# Patient Record
Sex: MALE | Race: WHITE | Employment: FULL TIME | URBAN - METROPOLITAN AREA
[De-identification: names, ages, dates, MRNs, and addresses within clinical notes are randomized per-mention and may not be internally consistent; named-entity substitution may affect disease eponyms.]

---

## 2018-01-10 PROBLEM — Z11.59 SCREENING FOR MEASLES: Status: ACTIVE | Noted: 2018-01-10

## 2018-01-10 PROBLEM — Z78.9 IMMUNE TO MEASLES: Status: ACTIVE | Noted: 2018-01-10

## 2018-01-16 PROBLEM — F33.9 RECURRENT DEPRESSION (HCC): Status: RESOLVED | Noted: 2018-01-16 | Resolved: 2018-01-16

## 2018-01-16 PROBLEM — F33.9 RECURRENT DEPRESSION (HCC): Status: ACTIVE | Noted: 2018-01-16

## 2018-02-14 ENCOUNTER — HOSPITAL ENCOUNTER (OUTPATIENT)
Dept: CT IMAGING | Age: 48
Discharge: HOME | End: 2018-02-14
Attending: INTERNAL MEDICINE
Payer: SELF-PAY

## 2018-02-14 DIAGNOSIS — Z13.6 ENCOUNTER FOR SCREENING FOR VASCULAR DISEASE: ICD-10-CM

## 2018-02-14 PROCEDURE — 75571 CT HRT W/O DYE W/CA TEST: CPT

## 2019-05-21 ENCOUNTER — HOSPITAL ENCOUNTER (OUTPATIENT)
Dept: CT IMAGING | Age: 49
Discharge: HOME | End: 2019-05-21
Attending: INTERNAL MEDICINE
Payer: COMMERCIAL

## 2019-05-21 PROCEDURE — 71250 CT THORAX DX C-: CPT

## 2021-07-19 ENCOUNTER — ANESTHESIA EVENT (OUTPATIENT)
Dept: SURGERY | Age: 51
End: 2021-07-19
Payer: COMMERCIAL

## 2021-07-20 ENCOUNTER — ANESTHESIA (OUTPATIENT)
Dept: SURGERY | Age: 51
End: 2021-07-20
Payer: COMMERCIAL

## 2021-07-20 ENCOUNTER — HOSPITAL ENCOUNTER (OUTPATIENT)
Age: 51
Setting detail: OUTPATIENT SURGERY
Discharge: HOME | End: 2021-07-20
Attending: ORTHOPAEDIC SURGERY | Admitting: ORTHOPAEDIC SURGERY
Payer: COMMERCIAL

## 2021-07-20 VITALS
TEMPERATURE: 97.6 F | HEART RATE: 56 BPM | BODY MASS INDEX: 26.14 KG/M2 | SYSTOLIC BLOOD PRESSURE: 109 MMHG | OXYGEN SATURATION: 98 % | RESPIRATION RATE: 19 BRPM | WEIGHT: 177 LBS | DIASTOLIC BLOOD PRESSURE: 72 MMHG

## 2021-07-20 DIAGNOSIS — G89.18 ACUTE POST-OPERATIVE PAIN: Primary | ICD-10-CM

## 2021-07-20 PROCEDURE — 28090 REMOVAL OF FOOT LESION: CPT | Performed by: ORTHOPAEDIC SURGERY

## 2021-07-20 PROCEDURE — 88305 TISSUE EXAM BY PATHOLOGIST: CPT

## 2021-07-20 PROCEDURE — 77030002916 HC SUT ETHLN J&J -A: Performed by: ORTHOPAEDIC SURGERY

## 2021-07-20 PROCEDURE — 76060000032 HC ANESTHESIA 0.5 TO 1 HR: Performed by: ORTHOPAEDIC SURGERY

## 2021-07-20 PROCEDURE — 76210000020 HC REC RM PH II FIRST 0.5 HR: Performed by: ORTHOPAEDIC SURGERY

## 2021-07-20 PROCEDURE — 2709999900 HC NON-CHARGEABLE SUPPLY: Performed by: ORTHOPAEDIC SURGERY

## 2021-07-20 PROCEDURE — 77030020754 HC CUF TRNQT 2BLA STRY -B: Performed by: ORTHOPAEDIC SURGERY

## 2021-07-20 PROCEDURE — 74011250636 HC RX REV CODE- 250/636: Performed by: NURSE PRACTITIONER

## 2021-07-20 PROCEDURE — 74011000250 HC RX REV CODE- 250: Performed by: ORTHOPAEDIC SURGERY

## 2021-07-20 PROCEDURE — 76010000154 HC OR TIME FIRST 0.5 HR: Performed by: ORTHOPAEDIC SURGERY

## 2021-07-20 PROCEDURE — 74011250636 HC RX REV CODE- 250/636: Performed by: REGISTERED NURSE

## 2021-07-20 PROCEDURE — 77030000032 HC CUF TRNQT ZIMM -B: Performed by: ORTHOPAEDIC SURGERY

## 2021-07-20 PROCEDURE — 76210000063 HC OR PH I REC FIRST 0.5 HR: Performed by: ORTHOPAEDIC SURGERY

## 2021-07-20 PROCEDURE — 77030002933 HC SUT MCRYL J&J -A: Performed by: ORTHOPAEDIC SURGERY

## 2021-07-20 PROCEDURE — 74011250636 HC RX REV CODE- 250/636: Performed by: ANESTHESIOLOGY

## 2021-07-20 PROCEDURE — 74011250637 HC RX REV CODE- 250/637: Performed by: ANESTHESIOLOGY

## 2021-07-20 RX ORDER — HYDROMORPHONE HYDROCHLORIDE 2 MG/ML
0.5 INJECTION, SOLUTION INTRAMUSCULAR; INTRAVENOUS; SUBCUTANEOUS
Status: DISCONTINUED | OUTPATIENT
Start: 2021-07-20 | End: 2021-07-20 | Stop reason: HOSPADM

## 2021-07-20 RX ORDER — MIDAZOLAM HYDROCHLORIDE 1 MG/ML
2 INJECTION, SOLUTION INTRAMUSCULAR; INTRAVENOUS
Status: DISCONTINUED | OUTPATIENT
Start: 2021-07-20 | End: 2021-07-20 | Stop reason: HOSPADM

## 2021-07-20 RX ORDER — MIDAZOLAM HYDROCHLORIDE 1 MG/ML
INJECTION, SOLUTION INTRAMUSCULAR; INTRAVENOUS AS NEEDED
Status: DISCONTINUED | OUTPATIENT
Start: 2021-07-20 | End: 2021-07-20 | Stop reason: HOSPADM

## 2021-07-20 RX ORDER — SODIUM CHLORIDE, SODIUM LACTATE, POTASSIUM CHLORIDE, CALCIUM CHLORIDE 600; 310; 30; 20 MG/100ML; MG/100ML; MG/100ML; MG/100ML
1000 INJECTION, SOLUTION INTRAVENOUS CONTINUOUS
Status: DISCONTINUED | OUTPATIENT
Start: 2021-07-20 | End: 2021-07-20 | Stop reason: HOSPADM

## 2021-07-20 RX ORDER — CEPHALEXIN 500 MG/1
500 CAPSULE ORAL 4 TIMES DAILY
Qty: 12 CAPSULE | Refills: 0 | Status: SHIPPED | OUTPATIENT
Start: 2021-07-20

## 2021-07-20 RX ORDER — ONDANSETRON 2 MG/ML
4 INJECTION INTRAMUSCULAR; INTRAVENOUS
Status: DISCONTINUED | OUTPATIENT
Start: 2021-07-20 | End: 2021-07-20 | Stop reason: HOSPADM

## 2021-07-20 RX ORDER — SODIUM CHLORIDE 0.9 % (FLUSH) 0.9 %
5-40 SYRINGE (ML) INJECTION EVERY 8 HOURS
Status: DISCONTINUED | OUTPATIENT
Start: 2021-07-20 | End: 2021-07-20 | Stop reason: HOSPADM

## 2021-07-20 RX ORDER — OXYCODONE HYDROCHLORIDE 5 MG/1
5 TABLET ORAL
Qty: 30 TABLET | Refills: 0 | Status: SHIPPED | OUTPATIENT
Start: 2021-07-20 | End: 2021-07-28

## 2021-07-20 RX ORDER — LIDOCAINE HYDROCHLORIDE 10 MG/ML
0.1 INJECTION INFILTRATION; PERINEURAL AS NEEDED
Status: DISCONTINUED | OUTPATIENT
Start: 2021-07-20 | End: 2021-07-20 | Stop reason: HOSPADM

## 2021-07-20 RX ORDER — SODIUM CHLORIDE 0.9 % (FLUSH) 0.9 %
5-40 SYRINGE (ML) INJECTION AS NEEDED
Status: DISCONTINUED | OUTPATIENT
Start: 2021-07-20 | End: 2021-07-20 | Stop reason: HOSPADM

## 2021-07-20 RX ORDER — BUPIVACAINE HYDROCHLORIDE 5 MG/ML
INJECTION, SOLUTION EPIDURAL; INTRACAUDAL AS NEEDED
Status: DISCONTINUED | OUTPATIENT
Start: 2021-07-20 | End: 2021-07-20 | Stop reason: HOSPADM

## 2021-07-20 RX ORDER — ALBUTEROL SULFATE 0.83 MG/ML
2.5 SOLUTION RESPIRATORY (INHALATION) AS NEEDED
Status: DISCONTINUED | OUTPATIENT
Start: 2021-07-20 | End: 2021-07-20 | Stop reason: HOSPADM

## 2021-07-20 RX ORDER — OXYCODONE HYDROCHLORIDE 5 MG/1
5 TABLET ORAL
Status: DISCONTINUED | OUTPATIENT
Start: 2021-07-20 | End: 2021-07-20 | Stop reason: HOSPADM

## 2021-07-20 RX ORDER — CEFAZOLIN SODIUM/WATER 2 G/20 ML
2 SYRINGE (ML) INTRAVENOUS ONCE
Status: COMPLETED | OUTPATIENT
Start: 2021-07-20 | End: 2021-07-20

## 2021-07-20 RX ORDER — PROPOFOL 10 MG/ML
INJECTION, EMULSION INTRAVENOUS
Status: DISCONTINUED | OUTPATIENT
Start: 2021-07-20 | End: 2021-07-20 | Stop reason: HOSPADM

## 2021-07-20 RX ORDER — PROPOFOL 10 MG/ML
INJECTION, EMULSION INTRAVENOUS AS NEEDED
Status: DISCONTINUED | OUTPATIENT
Start: 2021-07-20 | End: 2021-07-20 | Stop reason: HOSPADM

## 2021-07-20 RX ORDER — ACETAMINOPHEN 500 MG
1000 TABLET ORAL ONCE
Status: COMPLETED | OUTPATIENT
Start: 2021-07-20 | End: 2021-07-20

## 2021-07-20 RX ADMIN — ACETAMINOPHEN 1000 MG: 500 TABLET ORAL at 08:58

## 2021-07-20 RX ADMIN — PROPOFOL 50 MG: 10 INJECTION, EMULSION INTRAVENOUS at 11:33

## 2021-07-20 RX ADMIN — PROPOFOL 180 MCG/KG/MIN: 10 INJECTION, EMULSION INTRAVENOUS at 11:33

## 2021-07-20 RX ADMIN — PROPOFOL 50 MG: 10 INJECTION, EMULSION INTRAVENOUS at 11:40

## 2021-07-20 RX ADMIN — CEFAZOLIN 2 G: 1 INJECTION, POWDER, FOR SOLUTION INTRAVENOUS at 11:36

## 2021-07-20 RX ADMIN — MIDAZOLAM 2 MG: 1 INJECTION INTRAMUSCULAR; INTRAVENOUS at 11:31

## 2021-07-20 RX ADMIN — SODIUM CHLORIDE, POTASSIUM CHLORIDE, SODIUM LACTATE AND CALCIUM CHLORIDE 1000 ML: 600; 310; 30; 20 INJECTION, SOLUTION INTRAVENOUS at 08:59

## 2021-07-20 NOTE — OP NOTES
FULL OP NOTE    PATIENT NAME: Juliet Garrison  MRN: [id number]    DATE OF SURGERY: 7/20/2021    PREOPERATIVE DIAGNOSIS: Ganglion cyst of right foot [M67.471]      POSTOPERATIVE DIAGNOSIS: Ganglion cyst of right foot [M67.471]      PROCEDURE: Right foot soft tissue mass excision of medial foot, subcutaneous less than 1.5 cm², 47412    SURGEON: Tyler Pepe MD    ASSISTANT: Joselin Alonzo NP  An assistant was required for positioning, retraction, and wound closure for this procedure. HARDWARE: * No implants in log *  INDICATIONS: This patient is a 52y.o. year old male with a history of Ganglion cyst of right foot [M67.471] who has failed conservative therapy and desires surgical treatment. Risks and benefits of the procedure including, but not limited to, anesthetic complications as well as surgical complications including damage to nerves and blood vessels, risk of infection, risk of incomplete pain relief, risk of malunion, nonunion and need for additional surgery have been discussed with the patient who wishes to proceed. PROCEDURE IN DETAIL: A time out was done to confirm the operating procedure, surgeon, patient and site. During a preop surgical timeout the right lower extremity was identified as the correct surgical site and prepped and draped in a standard sterile fashion using ChloraPrep solution. A field block was obtained with 0.5% plain Marcaine. A medial approach to the mass was and opened. There was a large cystic mass identified at the medial aspect of the medial naviculocuneiform joint. The anterior tibial tendon was identified and carefully protected. The soft tissue mass was then excised sharply and then sent to pathology. The plantar aspect of the joint which appeared to be the area that the cyst was coming from was then cauterized using electrocautery. The wound was irrigated and closed using Monocryl nylon sutures. A sterile dressing was then applied.   Anesthesia was discontinued. The patient was transferred back to recovery bed. He was taken to recovery in satisfactory condition. He appeared to tolerate the procedure well. There were no apparent trouble or anesthetic complications. All needle and sponge counts are correct. TOURNIQUET TIME: Approx 6 minutes. SPECIMENS: none    ESTIMATED BLOOD LOSS: min mL.

## 2021-07-20 NOTE — ANESTHESIA PREPROCEDURE EVALUATION
Relevant Problems   NEUROLOGY   (+) ADD (attention deficit disorder)   (+) Depression      CARDIOVASCULAR   (+) Murmur       Anesthetic History     PONV          Review of Systems / Medical History  Patient summary reviewed and pertinent labs reviewed    Pulmonary        Sleep apnea           Neuro/Psych         Psychiatric history     Cardiovascular                  Exercise tolerance: >4 METS     GI/Hepatic/Renal     GERD: well controlled           Endo/Other             Other Findings              Physical Exam    Airway  Mallampati: II  TM Distance: > 6 cm  Neck ROM: normal range of motion   Mouth opening: Normal     Cardiovascular    Rhythm: regular  Rate: normal      Pertinent negatives: No murmur and peripheral edema   Dental  No notable dental hx       Pulmonary  Breath sounds clear to auscultation               Abdominal         Other Findings            Anesthetic Plan    ASA: 2  Anesthesia type: total IV anesthesia          Induction: Intravenous  Anesthetic plan and risks discussed with: Patient

## 2021-07-20 NOTE — DISCHARGE INSTRUCTIONS
INSTRUCTIONS FOLLOWING FOOT SURGERY    ACTIVITY  Elevate foot. No Ice    Protected partial weight bearing as tolerated in post op shoe. Let the office know if dressing gets saturated with water . DRESSING CARE Keep dry and in place until follow up appointment. Cover with cast bag or plastic bag when showering. CALL YOUR DOCTOR IF YOU HAVE  Excessive bleeding that does not stop after holding mild pressure over the area. Temperature of 101 degrees or above. Redness, excessive swelling or bruising, and/or green or yellow, smelly discharge from incision. Loss of sensation - cold, white or blue toes. DIET  Day of Surgery: Clear liquids until no nausea or vomiting; then light, bland diet (Baked chicken, plain rice, grits, scrambled egg, toast). Nothing Greasy, fried or spicy today  Advance to regular diet on second day, unless your doctor orders otherwise. PAIN  Take pain medications as directed by your doctor. Call your doctor if pain is NOT relieved by medication. PAIN MED SIDE EFFECTS  Constipation: Lots of fluids, try prune juice, then OTC stool softeners if necessary  Nausea: Take medication with food. AFTER ANESTHESIA  For the first 24 hours and while taking narcotics for pain: DO NOT Drive, Drink Alcoholic beverages, or make important Decisions. Be aware of dizziness following anesthesia and while taking pain medication. Preventing Infection at Home  We care about preventing infection and avoiding the spread of germs - not only when you are in the hospital but also when you return home. When you return home from the hospital, its important to take the following steps to help prevent infection and avoid spreading germs that could infect you and others. Ask everyone in your home to follow these guidelines, too. Clean Your Hands  · Clean your hands whenever your hands are visibly dirty, before you eat, before or after touching your mouth, nose or eyes, and before preparing food.  Clean them after contact with body fluids, using the restroom, touching animals or changing diapers. · When washing hands, wet them with warm water and work up a lather. Rub hands for at least 15 seconds, then rinse them and pat them dry with a clean towel or paper towel. · When using hand sanitizers, it should take about 15 seconds to rub your hands dry. If not, you probably didnt apply enough . Cover Your Sneeze or Cough  Germs are released into the air whenever you sneeze or cough. To prevent the spread of infection:  · Turn away from other people before coughing or sneezing. · Cover your mouth or nose with a tissue when you cough or sneeze. Put the tissue in the trash. · If you dont have a tissue, cough or sneeze into your upper sleeve, not your hands. · Always clean your hands after coughing or sneezing. Care for Wounds  Your skin is your bodys first line of defense against germs, but an open wound leaves an easy way for germs to enter your body. To prevent infection:  · Clean your hands before and after changing wound dressings, and wear gloves to change dressings if recommended by your doctor. · Take special care with IV lines or other devices inserted into the body. If you must touch them, clean your hands first.  · Follow any specific instructions from your doctor to care for your wounds. Contact your doctor if you experience any signs of infection, such as fever or increased redness at the surgical or wound site. Keep a Clean Home  · Clean or wipe commonly touched hard surfaces like door handles, sinks, tabletops, phones and TV remotes. · Use products labeled disinfectant to kill harmful bacteria and viruses. · Use a clean cloth or paper towel to clean and dry surfaces. Wiping surfaces with a dirty dishcloth, sponge or towel will only spread germs.   · Never share toothbrushes, [place], drinking glasses, utensils, razor blades, face cloths or bath towels to avoid spreading germs.  · Be sure that the linens that you sleep on are clean. · Keep pets away from wounds and wash your hands after touching pets, their toys or bedding. We care about you and your health. Remember, preventing infections is a team effort between you, your family, friends and health care providers. DISCHARGE SUMMARY from Nurse    PATIENT INSTRUCTIONS:    After general anesthesia or intravenous sedation, for 24 hours or while taking prescription Narcotics:  · Limit your activities  · Do not drive and operate hazardous machinery  · Do not make important personal or business decisions  · Do  not drink alcoholic beverages  · If you have not urinated within 8 hours after discharge, please contact your surgeon on call. *  Please give a list of your current medications to your Primary Care Provider. *  Please update this list whenever your medications are discontinued, doses are      changed, or new medications (including over-the-counter products) are added. *  Please carry medication information at all times in case of emergency situations. These are general instructions for a healthy lifestyle:    No smoking/ No tobacco products/ Avoid exposure to second hand smoke    Surgeon General's Warning:  Quitting smoking now greatly reduces serious risk to your health. Obesity, smoking, and sedentary lifestyle greatly increases your risk for illness    A healthy diet, regular physical exercise & weight monitoring are important for maintaining a healthy lifestyle    You may be retaining fluid if you have a history of heart failure or if you experience any of the following symptoms:  Weight gain of 3 pounds or more overnight or 5 pounds in a week, increased swelling in our hands or feet or shortness of breath while lying flat in bed. Please call your doctor as soon as you notice any of these symptoms; do not wait until your next office visit.     Recognize signs and symptoms of STROKE:    F-face looks uneven    A-arms unable to move or move unevenly    S-speech slurred or non-existent    T-time-call 911 as soon as signs and symptoms begin-DO NOT go       Back to bed or wait to see if you get better-TIME IS BRAIN. Learning About Coronavirus [phone])  Coronavirus [phone]): Overview  What is coronavirus (COVID-19)? The coronavirus disease (COVID-19) is caused by a virus. It is an illness that was first found in [place], [place], in December 2019. It has since spread worldwide. The virus can cause fever, cough, and trouble breathing. In severe cases, it can cause pneumonia and make it hard to breathe without help. It can cause death. Coronaviruses are a large group of viruses. They cause the common cold. They also cause more serious illnesses like Middle East respiratory syndrome (MERS) and severe acute respiratory syndrome (SARS). COVID-19 is caused by a novel coronavirus. That means it's a new type that has not been seen in people before. This virus spreads person-to-person through droplets from coughing and sneezing. It can also spread when you are close to someone who is infected. And it can spread when you touch something that has the virus on it, such as a doorknob or a tabletop. What can you do to protect yourself from coronavirus (COVID-19)? The best way to protect yourself from getting sick is to:  · Avoid areas where there is an outbreak. · Avoid contact with people who may be infected. · Wash your hands often with soap or alcohol-based hand sanitizers. · Avoid crowds and try to stay at least 6 feet away from other people. · Wash your hands often, especially after you cough or sneeze. Use soap and water, and scrub for at least 20 seconds. If soap and water aren't available, use an alcohol-based hand . · Avoid touching your mouth, nose, and eyes. What can you do to avoid spreading the virus to others?   To help avoid spreading the virus to others:  · Cover your mouth with a tissue when you cough or sneeze. Then throw the tissue in the trash. · Use a disinfectant to clean things that you touch often. · Wear a cloth face cover if you have to go to public areas. · Stay home if you are sick or have been exposed to the virus. Don't go to school, work, or public areas. And don't use public transportation, ride-shares, or taxis unless you have no choice. · If you are sick:  ? Leave your home only if you need to get medical care. But call the doctor's office first so they know you're coming. And wear a face cover. ? Wear the face cover whenever you're around other people. It can help stop the spread of the virus when you cough or sneeze. ? Clean and disinfect your home every day. Use household  and disinfectant wipes or sprays. Take special care to clean things that you grab with your hands. These include doorknobs, remote controls, phones, and handles on your refrigerator and microwave. And don't forget countertops, tabletops, bathrooms, and computer keyboards. When to call for help  [id number] anytime you think you may need emergency care. For example, call if:  · You have severe trouble breathing. (You can't talk at all.)  · You have constant chest pain or pressure. · You are severely dizzy or lightheaded. · You are confused or can't think clearly. · Your face and lips have a blue color. · You pass out (lose consciousness) or are very hard to wake up. Call your doctor now if you develop symptoms such as:  · Shortness of breath. · Fever. · Cough. If you need to get care, call ahead to the doctor's office for instructions before you go. Make sure you wear a face cover to prevent exposing other people to the virus. Where can you get the latest information? The following health organizations are tracking and studying this virus. Their websites contain the most up-to-date information. Manda Prather also learn what to do if you think you may have been exposed to the virus. · [place]  [facility] for Disease Control and Prevention (CDC): The CDC provides updated news about the disease and travel advice. The website also tells you how to prevent the spread of infection. www.cdc.gov  · World Health Organization [facility]): WHO offers information about the virus outbreaks. WHO also has travel advice. www.who.int  Current as of: May 8, 2020               Content Version: 12.5  © 2006-2020 Healthwise, txtr. Care instructions adapted under license by [employer] (which disclaims liability or warranty for this information). If you have questions about a medical condition or this instruction, always ask your healthcare professional. [address] any warranty or liability for your use of this information.

## 2021-07-20 NOTE — BRIEF OP NOTE
Brief Postoperative Note    Patient: Manny Andres  YOB: 1970  MRN: [id number]    Date of Procedure: 7/20/2021     Pre-Op Diagnosis: Ganglion cyst of right foot [M67.471]    Post-Op Diagnosis: Same as preoperative diagnosis.       Procedure(s):  RIGHT FOOT SOFT TISSUE MASS EXCISION    Surgeon(s):  Fredrick Bo MD    Surgical Assistant: Nurse Practitioner: Masha Calderon RN    Anesthesia: MAC     Estimated Blood Loss (mL): Minimal    Complications: None    Specimens:   ID Type Source Tests Collected by Time Destination   1 : right foot soft tissue mass  Preservative Foot, Right  Fredrick Bo MD 7/20/2021 1140 Pathology        Implants: * No implants in log *    Drains: * No LDAs found *    Findings:     Electronically Signed by Rohith Neal MD on 7/20/2021 at 3:38 PM

## 2021-07-20 NOTE — ANESTHESIA POSTPROCEDURE EVALUATION
Procedure(s):  RIGHT FOOT SOFT TISSUE MASS EXCISION. total IV anesthesia    Anesthesia Post Evaluation      Multimodal analgesia: multimodal analgesia used between 6 hours prior to anesthesia start to PACU discharge  Patient location during evaluation: bedside  Patient participation: complete - patient participated  Level of consciousness: awake and responsive to light touch  Pain management: adequate  Airway patency: patent  Anesthetic complications: no  Cardiovascular status: acceptable, hemodynamically stable, blood pressure returned to baseline and stable  Respiratory status: acceptable, unassisted, spontaneous ventilation and nonlabored ventilation  Hydration status: acceptable        INITIAL Post-op Vital signs:   Vitals Value Taken Time   BP 97/56 07/20/21 1202   Temp 36.4 °C (97.6 °F) 07/20/21 1157   Pulse 53 07/20/21 1204   Resp 12 07/20/21 1204   SpO2 99 % 07/20/21 1204   Vitals shown include unvalidated device data.

## 2021-07-20 NOTE — PERIOP NOTES
PACU DISCHARGE NOTE  Vital signs stable, pain well controlled, alert and oriented times three or at baseline, no anesthetic complications. IV removed with catheter tip intact. Written and verbal discharge instructions given, including pain control and follow up appointment. Spouse, Lisbet Graff, verbalized understanding and signed discharge instructions. All questions answered prior to discharge. Dr Renuka Batista okay to discharge at this time. Pt and all belongings taken via wheelchair and safely put in vehicle.

## 2021-07-20 NOTE — H&P
Outpatient Surgery History and Physical:  Yanelis Barahona was seen and examined. CHIEF COMPLAINT:   Right foot. PE:   There were no vitals taken for this visit. Heart:   Regular rhythm      Lungs:  Are clear      Past Medical History:    Patient Active Problem List    Diagnosis    Immune to measles    Screening for measles    Hyperlipidemia    Vitamin D deficiency    Murmur    ADD (attention deficit disorder)    Inguinal hernia    Depression       Surgical History:   Past Surgical History:   Procedure Laterality Date    HX HERNIA REPAIR  2009    bilateral    HX VASECTOMY         Social History: Patient  reports that he has never smoked. He has never used smokeless tobacco. He reports current alcohol use of about 8.0 standard drinks of alcohol per week. He reports that he does not use drugs. Family History:   Family History   Problem Relation Age of Onset    Hypertension Father     Elevated Lipids Father     Diabetes Father     Cancer Maternal Grandfather     No Known Problems Mother        Allergies: Reviewed per EMR  No Known Allergies    Medications:    No current facility-administered medications on file prior to encounter. Current Outpatient Medications on File Prior to Encounter   Medication Sig    dexmethylphenidate 10 mg tab Take 10 mg by mouth two (2) times a day.  tadalafiL (Cialis) 20 mg tablet Take one tab by mouth daily as needed       The surgery is planned for the RIGHT FOOT SOFT TISSUE MASS EXCISION . History and physical has been reviewed. The patient has been examined. There have been no significant clinical changes since the completion of the originally dated History and Physical.  Patient identified by surgeon; surgical site was confirmed by patient and surgeon. The patient is here today for outpatient surgery. I have examined the patient, no changes are noted in the patient's medical status.  Necessity for the procedure/care is still present and the history and physical above is current. See the office notes for the full long term history of the problem. Please see the recent office notes for the musculoskeletal examination.     Signed By: Yulia Major NP     July 20, 2021 8:20 AM

## 2021-08-17 ENCOUNTER — HOSPITAL ENCOUNTER (OUTPATIENT)
Dept: LAB | Age: 51
Discharge: HOME | End: 2021-08-17

## 2021-08-17 PROCEDURE — 88305 TISSUE EXAM BY PATHOLOGIST: CPT

## 2022-03-18 PROBLEM — Z78.9 IMMUNE TO MEASLES: Status: ACTIVE | Noted: 2018-01-10

## 2022-03-19 PROBLEM — Z11.59 SCREENING FOR MEASLES: Status: ACTIVE | Noted: 2018-01-10

## 2022-09-19 ENCOUNTER — OFFICE VISIT (OUTPATIENT)
Dept: UROLOGY | Age: 52
End: 2022-09-19
Payer: COMMERCIAL

## 2022-09-19 DIAGNOSIS — R97.20 ELEVATED PSA: Primary | ICD-10-CM

## 2022-09-19 LAB
BILIRUBIN, URINE, POC: NEGATIVE
BLOOD URINE, POC: NEGATIVE
GLUCOSE URINE, POC: NEGATIVE
KETONES, URINE, POC: NEGATIVE
LEUKOCYTE ESTERASE, URINE, POC: NEGATIVE
NITRITE, URINE, POC: NEGATIVE
PH, URINE, POC: 6 (ref 4.6–8)
PROTEIN,URINE, POC: NEGATIVE
SPECIFIC GRAVITY, URINE, POC: 1 (ref 1–1.03)
URINALYSIS CLARITY, POC: NORMAL
URINALYSIS COLOR, POC: NORMAL
UROBILINOGEN, POC: 0.2

## 2022-09-19 PROCEDURE — 81003 URINALYSIS AUTO W/O SCOPE: CPT | Performed by: UROLOGY

## 2022-09-19 PROCEDURE — 99204 OFFICE O/P NEW MOD 45 MIN: CPT | Performed by: UROLOGY

## 2022-09-19 ASSESSMENT — ENCOUNTER SYMPTOMS
RESPIRATORY NEGATIVE: 1
BLOOD IN STOOL: 1
HEARTBURN: 1
EYES NEGATIVE: 1
INDIGESTION: 1

## 2022-09-19 NOTE — PROGRESS NOTES
[facility]  [address] [facility] [facility]    Ryan Aguilar [address], [address]  : 1970    Chief Complaint   Patient presents with    Elevated PSA          HPI     Larissa Duran is a 54 y.o. male referred in regards to elevated psa. His father has an elevated psa but never been diagnosed with prostate cancer. Patient has no LUTS. Patient can think of no other instigating or exacerbating events. PSA:  3.5,  3.0,  4.2          Past Medical History:   Diagnosis Date    ADD (attention deficit disorder)     COVID-19 vaccine series completed 2021    pt to bring card dos    Depression 2013    no current meds    GERD (gastroesophageal reflux disease)     prn  med    Hyperlipidemia 05/15/2015    diet controlled    Leukopenia 2013    in past-- not a current problem--- pt states never been a problem    Murmur 2014    \" slight\"-- per pt per dr Kelli Dent always hear it---- never had an echo    Nausea & vomiting     with hernia surg    Right foot pain     Sleep apnea     per pt-- plans to be tested in near future    Vitamin D deficiency 2015     Past Surgical History:   Procedure Laterality Date    HERNIA REPAIR  2009    bilateral    VASECTOMY       Current Outpatient Medications   Medication Sig Dispense Refill    dexmethylphenidate (FOCALIN) 10 MG tablet Take 10 mg by mouth 2 times daily. famotidine (PEPCID) 20 MG tablet Take 20 mg by mouth daily as needed      tadalafil (CIALIS) 20 MG tablet Take one tab by mouth daily as needed       No current facility-administered medications for this visit.      Not on File  Social History     Socioeconomic History    Marital status:      Spouse name: Not on file    Number of children: Not on file    Years of education: Not on file    Highest education level: Not on file   Occupational History    Not on file   Tobacco Use    Smoking status: Never    Smokeless 3, GAIT NORMAL  CARDIAC: regular rate and rhythm  CHEST AND LUNG: Easy work of breathing, clear to auscultation bilaterally  ABDOMEN: soft, non tender, non distended, + bowel sounds, no organomegaly, no palpable masses  BRADLEY: no nodule  NEUROLOGIC: cranial nerves 2-12 grossly intact           Assessment and Plan    ICD-10-CM    1. Elevated PSA  R97.20 AMB POC URINALYSIS DIP STICK AUTO W/O MICRO     MRI PROSTATE W WO CONTRAST          We first discussed the physiology of psa. We also discussed potential causes of elevated psa including prostate cancer, inflammation, infection, BPH, and idiopathic elevations. Treatment options including rechecking psa vs. MRI prostate vs. going forward with prostate biopsy were discussed. MRI prostate was ordered. He will return to discuss results.

## 2022-10-03 ENCOUNTER — HOSPITAL ENCOUNTER (OUTPATIENT)
Dept: MRI IMAGING | Age: 52
Discharge: HOME | End: 2022-10-06

## 2022-10-03 DIAGNOSIS — R97.20 ELEVATED PSA: ICD-10-CM

## 2022-10-19 ENCOUNTER — OFFICE VISIT (OUTPATIENT)
Dept: UROLOGY | Age: 52
End: 2022-10-19
Payer: COMMERCIAL

## 2022-10-19 DIAGNOSIS — R97.20 ELEVATED PSA: Primary | ICD-10-CM

## 2022-10-19 DIAGNOSIS — N52.9 ERECTILE DYSFUNCTION, UNSPECIFIED ERECTILE DYSFUNCTION TYPE: ICD-10-CM

## 2022-10-19 LAB
BILIRUBIN, URINE, POC: NEGATIVE
BLOOD URINE, POC: NEGATIVE
GLUCOSE URINE, POC: NEGATIVE
KETONES, URINE, POC: NEGATIVE
LEUKOCYTE ESTERASE, URINE, POC: NEGATIVE
NITRITE, URINE, POC: NEGATIVE
PH, URINE, POC: 6.5 (ref 4.6–8)
PROTEIN,URINE, POC: NEGATIVE
SPECIFIC GRAVITY, URINE, POC: 1.01 (ref 1–1.03)
URINALYSIS CLARITY, POC: NORMAL
URINALYSIS COLOR, POC: NORMAL
UROBILINOGEN, POC: NORMAL

## 2022-10-19 PROCEDURE — 99214 OFFICE O/P EST MOD 30 MIN: CPT | Performed by: UROLOGY

## 2022-10-19 PROCEDURE — 81003 URINALYSIS AUTO W/O SCOPE: CPT | Performed by: UROLOGY

## 2022-10-19 RX ORDER — TADALAFIL 20 MG/1
TABLET ORAL
Qty: 30 TABLET | Refills: 5 | Status: SHIPPED | OUTPATIENT
Start: 2022-10-19

## 2022-10-19 RX ORDER — CIPROFLOXACIN 500 MG/1
500 TABLET, FILM COATED ORAL 2 TIMES DAILY
Qty: 2 TABLET | Refills: 0 | Status: SHIPPED | OUTPATIENT
Start: 2022-10-19 | End: 2022-10-20

## 2022-10-19 ASSESSMENT — ENCOUNTER SYMPTOMS: NAUSEA: 0

## 2022-10-19 NOTE — H&P (VIEW-ONLY)
[facility]  [address] [facility]    Frances[facility] [address], [address]   [address]  : 1970    Chief Complaint   Patient presents with    Follow-up          HPI     Lisa Barrios is a 50 y.o. male referred in regards to elevated psa. His father has an elevated psa but never been diagnosed with prostate cancer. Patient has no LUTS. He uses tadalafil in regards to ed. MRI prostate 10/3/22 revealed a PIRADS4 and PIRADS5 lesion. PSA:  3.5,  3.0,  4.2          Past Medical History:   Diagnosis Date    ADD (attention deficit disorder)     COVID-19 vaccine series completed 2021    pt to bring card dos    Depression 2013    no current meds    GERD (gastroesophageal reflux disease)     prn  med    Hyperlipidemia 05/15/2015    diet controlled    Leukopenia 2013    in past-- not a current problem--- pt states never been a problem    Murmur 2014    \" slight\"-- per pt per dr Marium Robert always hear it---- never had an echo    Nausea & vomiting     with hernia surg    Right foot pain     Sleep apnea     per pt-- plans to be tested in near future    Vitamin D deficiency 2015     Past Surgical History:   Procedure Laterality Date    HERNIA REPAIR  2009    bilateral    VASECTOMY       Current Outpatient Medications   Medication Sig Dispense Refill    tadalafil (CIALIS) 20 MG tablet Take 1 tablet by mouth daily 30 tablet 5    ciprofloxacin (CIPRO) 500 MG tablet Take 1 tablet by mouth 2 times daily for 1 day Begin AM of prostate biopsy. 2 tablet 0    dexmethylphenidate (FOCALIN) 10 MG tablet Take 10 mg by mouth 2 times daily. famotidine (PEPCID) 20 MG tablet Take 20 mg by mouth daily as needed       No current facility-administered medications for this visit.      No Known Allergies  Social History     Socioeconomic History    Marital status:      Spouse name: Not on file    Number of children: Not on file Years of education: Not on file    Highest education level: Not on file   Occupational History    Not on file   Tobacco Use    Smoking status: Never    Smokeless tobacco: Never   Substance and Sexual Activity    Alcohol use: Yes     Alcohol/week: 8.0 standard drinks    Drug use: No    Sexual activity: Not on file   Other Topics Concern    Not on file   Social History Narrative    Not on file     Social Determinants of Health     Financial Resource Strain: Not on file   Food Insecurity: Not on file   Transportation Needs: Not on file   Physical Activity: Not on file   Stress: Not on file   Social Connections: Not on file   Intimate Partner Violence: Not on file   Housing Stability: Not on file     Family History   Problem Relation Age of Onset    Cancer Maternal Grandfather     No Known Problems Mother     Hypertension Father     Elevated Lipids Father     Diabetes Father        Review of Systems  Constitutional:   Negative for fever. GI:  Negative for nausea. Genitourinary:  Negative for flank pain.     Urinalysis  UA - Dipstick  Results for orders placed or performed in visit on 10/19/22   AMB POC URINALYSIS DIP STICK AUTO W/O MICRO   Result Value Ref Range    Color (UA POC)      Clarity (UA POC)      Glucose, Urine, POC Negative Negative    Bilirubin, Urine, POC Negative Negative    KETONES, Urine, POC Negative Negative    Specific Gravity, Urine, POC 1.010 1.001 - 1.035    Blood (UA POC) Negative Negative    pH, Urine, POC 6.5 4.6 - 8.0    Protein, Urine, POC Negative Negative    Urobilinogen, POC 0.2 mg/dL     Nitrite, Urine, POC Negative Negative    Leukocyte Esterase, Urine, POC Negative Negative   Results for orders placed or performed in visit on 09/19/22   AMB POC URINALYSIS DIP STICK AUTO W/O MICRO   Result Value Ref Range    Color, Urine, POC      Clarity, Urine, POC      Glucose, Urine, POC Negative Negative    Bilirubin, Urine, POC Negative Negative    Ketones, Urine, POC Negative Negative Specific Gravity, Urine, POC 1.005 1.001 - 1.035    Blood, Urine, POC NEGATIVE Negative    pH, Urine, POC 6.0 4.6 - 8.0    Protein, Urine, POC Negative Negative    Urobilinogen, POC 0.2     Nitrate, Urine, POC NEGATIVE Negative    Leukocyte Esterase, Urine, POC Negative Negative       There were no vitals taken for this visit. GENERAL: NAD, ALERT, A&O x 3, GAIT NORMAL  CARDIAC: regular rate and rhythm  CHEST AND LUNG: Easy work of breathing, clear to auscultation bilaterally  ABDOMEN: soft, non tender, non distended, + bowel sounds, no organomegaly, no palpable masses  NEUROLOGIC: cranial nerves 2-12 grossly intact           Assessment and Plan    ICD-10-CM    1. Elevated PSA  R97.20 AMB POC URINALYSIS DIP STICK AUTO W/O MICRO     ciprofloxacin (CIPRO) 500 MG tablet      2. Erectile dysfunction, unspecified erectile dysfunction type  N52.9 tadalafil (CIALIS) 20 MG tablet          In regards to elevated psa, mri results were reviewed. Options were discussed. He will be scheduled for uronav guided prostate biopsy. Potential risks were discussed. In regards to ed, tadalafil was refilled.

## 2022-10-19 NOTE — PROGRESS NOTES
[facility]  [address] [facility]    Oralia Schuler [address], [address]  : 1970    Chief Complaint   Patient presents with    Follow-up          HPI     Coby Chanel is a 50 y.o. male referred in regards to elevated psa. His father has an elevated psa but never been diagnosed with prostate cancer. Patient has no LUTS. He uses tadalafil in regards to ed. MRI prostate 10/3/22 revealed a PIRADS4 and PIRADS5 lesion. PSA:  3.5,  3.0,  4.2          Past Medical History:   Diagnosis Date    ADD (attention deficit disorder)     COVID-19 vaccine series completed 2021    pt to bring card dos    Depression 2013    no current meds    GERD (gastroesophageal reflux disease)     prn  med    Hyperlipidemia 05/15/2015    diet controlled    Leukopenia 2013    in past-- not a current problem--- pt states never been a problem    Murmur 2014    \" slight\"-- per pt per dr Quinton Castro always hear it---- never had an echo    Nausea & vomiting     with hernia surg    Right foot pain     Sleep apnea     per pt-- plans to be tested in near future    Vitamin D deficiency 2015     Past Surgical History:   Procedure Laterality Date    HERNIA REPAIR  2009    bilateral    VASECTOMY       Current Outpatient Medications   Medication Sig Dispense Refill    tadalafil (CIALIS) 20 MG tablet Take 1 tablet by mouth daily 30 tablet 5    ciprofloxacin (CIPRO) 500 MG tablet Take 1 tablet by mouth 2 times daily for 1 day Begin AM of prostate biopsy. 2 tablet 0    dexmethylphenidate (FOCALIN) 10 MG tablet Take 10 mg by mouth 2 times daily. famotidine (PEPCID) 20 MG tablet Take 20 mg by mouth daily as needed       No current facility-administered medications for this visit.      No Known Allergies  Social History     Socioeconomic History    Marital status:      Spouse name: Not on file    Number of children: Not on file Years of education: Not on file    Highest education level: Not on file   Occupational History    Not on file   Tobacco Use    Smoking status: Never    Smokeless tobacco: Never   Substance and Sexual Activity    Alcohol use: Yes     Alcohol/week: 8.0 standard drinks    Drug use: No    Sexual activity: Not on file   Other Topics Concern    Not on file   Social History Narrative    Not on file     Social Determinants of Health     Financial Resource Strain: Not on file   Food Insecurity: Not on file   Transportation Needs: Not on file   Physical Activity: Not on file   Stress: Not on file   Social Connections: Not on file   Intimate Partner Violence: Not on file   Housing Stability: Not on file     Family History   Problem Relation Age of Onset    Cancer Maternal Grandfather     No Known Problems Mother     Hypertension Father     Elevated Lipids Father     Diabetes Father        Review of Systems  Constitutional:   Negative for fever. GI:  Negative for nausea. Genitourinary:  Negative for flank pain.     Urinalysis  UA - Dipstick  Results for orders placed or performed in visit on 10/19/22   AMB POC URINALYSIS DIP STICK AUTO W/O MICRO   Result Value Ref Range    Color (UA POC)      Clarity (UA POC)      Glucose, Urine, POC Negative Negative    Bilirubin, Urine, POC Negative Negative    KETONES, Urine, POC Negative Negative    Specific Gravity, Urine, POC 1.010 1.001 - 1.035    Blood (UA POC) Negative Negative    pH, Urine, POC 6.5 4.6 - 8.0    Protein, Urine, POC Negative Negative    Urobilinogen, POC 0.2 mg/dL     Nitrite, Urine, POC Negative Negative    Leukocyte Esterase, Urine, POC Negative Negative   Results for orders placed or performed in visit on 09/19/22   AMB POC URINALYSIS DIP STICK AUTO W/O MICRO   Result Value Ref Range    Color, Urine, POC      Clarity, Urine, POC      Glucose, Urine, POC Negative Negative    Bilirubin, Urine, POC Negative Negative    Ketones, Urine, POC Negative Negative Specific Gravity, Urine, POC 1.005 1.001 - 1.035    Blood, Urine, POC NEGATIVE Negative    pH, Urine, POC 6.0 4.6 - 8.0    Protein, Urine, POC Negative Negative    Urobilinogen, POC 0.2     Nitrate, Urine, POC NEGATIVE Negative    Leukocyte Esterase, Urine, POC Negative Negative       There were no vitals taken for this visit. GENERAL: NAD, ALERT, A&O x 3, GAIT NORMAL  CARDIAC: regular rate and rhythm  CHEST AND LUNG: Easy work of breathing, clear to auscultation bilaterally  ABDOMEN: soft, non tender, non distended, + bowel sounds, no organomegaly, no palpable masses  NEUROLOGIC: cranial nerves 2-12 grossly intact           Assessment and Plan    ICD-10-CM    1. Elevated PSA  R97.20 AMB POC URINALYSIS DIP STICK AUTO W/O MICRO     ciprofloxacin (CIPRO) 500 MG tablet      2. Erectile dysfunction, unspecified erectile dysfunction type  N52.9 tadalafil (CIALIS) 20 MG tablet          In regards to elevated psa, mri results were reviewed. Options were discussed. He will be scheduled for uronav guided prostate biopsy. Potential risks were discussed. In regards to ed, tadalafil was refilled.

## 2022-10-20 ENCOUNTER — TELEPHONE (OUTPATIENT)
Dept: UROLOGY | Age: 52
End: 2022-10-20

## 2022-10-20 PROBLEM — R97.20 ELEVATED PSA: Status: ACTIVE | Noted: 2022-10-20

## 2022-10-20 NOTE — TELEPHONE ENCOUNTER
Procedures: Procedure(s):   PROSTATE BIOPSY MRI FUSION   Date: 10/26/2022   Time: 1030   Location: [facility] MAIN OR 03     Scheduled.

## 2022-10-20 NOTE — TELEPHONE ENCOUNTER
----- Message from Michelle Denson MD sent at 10/19/2022  4:54 PM EDT -----  Regarding: schedule  Hospital: [address] [place] [address]     Surgeon: justino  Assist: NONE    Diagnosis: elevated psa and prostate mass    Procedure: Brandon Wood guided prostate biopsy    Posting time: 1 hour    Special Instruments Needed:  NONE    Anesthesia: GENERAL    Labs: CBC, BMP    Tests: EKG per anesthesia    Blood: NONE    Bowel Prep: NONE    Special Instructions: none    Orders/HandP: done/done    Follow up appointment: talk bx 2 weeks after

## 2022-10-21 NOTE — PROGRESS NOTES
PLEASE CONTINUE TAKING ALL PRESCRIPTION MEDICATIONS UP TO THE DAY OF SURGERY UNLESS OTHERWISE DIRECTED BELOW. DISCONTINUE all vitamins and supplements 7 days prior to surgery. DISCONTINUE Non-Steriodal Anti-Inflammatory (NSAIDS) such as Advil and Aleve 5 days prior to surgery. Home Medications to take  the day of surgery    Focalin, pepcid           Home Medications   to Hold   none        Comments       On the day before surgery please take Acetaminophen 1000mg in the morning and then again before bed. You may substitute for Tylenol 650 mg. Please do not bring home medications with you on the day of surgery unless otherwise directed by your nurse. If you are instructed to bring home medications, please give them to your nurse as they will be administered by the nursing staff. If you have any questions, please call [address] [place] [address] [phone] or [facility] [phone]. A copy of this note was provided to the patient for reference.

## 2022-10-21 NOTE — PROGRESS NOTES
Patient verified name and     Order for consent WAS found in EHR and matches case posting; patient verified. Type 1B surgery, PHONE assessment complete. Labs per surgeon: CBC, BMP--PT COMING PRIOR TO DOS FOR THESE LABS-- ORDERS IN CC  FROM THE OFFICE-- NOTE PLACED FOR CHART  TO F/U WITH RESULTS  Labs per anesthesia protocol: NONE  EKG: [facility] approved surgical skin cleanser and instructions given per hospital policy. Patient provided with and instructed on educational handouts including Guide to Surgery, Pain Management, Hand Hygiene, Blood Transfusion Education, and [facility] Brochure. Patient answered medical/surgical history questions at their best of ability. All prior to admission medications documented in [facility]. Original medication prescription bottle WAS NOT visualized during patient appointment. Patient instructed to hold all vitamins 7 days prior to surgery and NSAIDS 5 days prior to surgery, patient verbalized understanding. Patient teach back successful and patient demonstrates knowledge of instructions.

## 2022-10-25 NOTE — PERIOP NOTE
Directly informed patient and or family member of pre op arrival time 0800 on 10/26. All questions answered. Pre op instructions reviewed. Left contact information for any additional questions or needs.

## 2022-10-26 ENCOUNTER — HOSPITAL ENCOUNTER (OUTPATIENT)
Age: 52
Setting detail: OUTPATIENT SURGERY
Discharge: HOME | End: 2022-10-26
Attending: UROLOGY | Admitting: UROLOGY
Payer: COMMERCIAL

## 2022-10-26 ENCOUNTER — ANESTHESIA (OUTPATIENT)
Dept: SURGERY | Age: 52
End: 2022-10-26
Payer: COMMERCIAL

## 2022-10-26 ENCOUNTER — ANESTHESIA EVENT (OUTPATIENT)
Dept: SURGERY | Age: 52
End: 2022-10-26
Payer: COMMERCIAL

## 2022-10-26 VITALS
OXYGEN SATURATION: 99 % | RESPIRATION RATE: 16 BRPM | DIASTOLIC BLOOD PRESSURE: 73 MMHG | SYSTOLIC BLOOD PRESSURE: 114 MMHG | TEMPERATURE: 97.9 F | WEIGHT: 179 LBS | BODY MASS INDEX: 26.51 KG/M2 | HEIGHT: 69 IN | HEART RATE: 70 BPM

## 2022-10-26 DIAGNOSIS — R97.20 ELEVATED PSA: ICD-10-CM

## 2022-10-26 PROBLEM — N42.89 PROSTATE MASS: Status: ACTIVE | Noted: 2022-10-26

## 2022-10-26 LAB
ANION GAP SERPL CALC-SCNC: 6 MMOL/L (ref 2–11)
BUN SERPL-MCNC: 15 MG/DL (ref 6–23)
CALCIUM SERPL-MCNC: 9.3 MG/DL (ref 8.3–10.4)
CHLORIDE SERPL-SCNC: 111 MMOL/L (ref 101–110)
CO2 SERPL-SCNC: 25 MMOL/L (ref 21–32)
CREAT SERPL-MCNC: 1.1 MG/DL (ref 0.8–1.5)
ERYTHROCYTE [DISTWIDTH] IN BLOOD BY AUTOMATED COUNT: 13 % (ref 11.9–14.6)
GLUCOSE SERPL-MCNC: 107 MG/DL (ref 65–100)
HCT VFR BLD AUTO: 44.5 % (ref 41.1–50.3)
HGB BLD-MCNC: 15.3 G/DL (ref 13.6–17.2)
MCH RBC QN AUTO: 27.8 PG (ref 26.1–32.9)
MCHC RBC AUTO-ENTMCNC: 34.4 G/DL (ref 31.4–35)
MCV RBC AUTO: 80.8 FL (ref 82–102)
NRBC # BLD: 0 K/UL (ref 0–0.2)
PLATELET # BLD AUTO: 220 K/UL (ref 150–450)
PMV BLD AUTO: 9.6 FL (ref 9.4–12.3)
POTASSIUM SERPL-SCNC: 3.9 MMOL/L (ref 3.5–5.1)
RBC # BLD AUTO: 5.51 M/UL (ref 4.23–5.6)
SODIUM SERPL-SCNC: 142 MMOL/L (ref 133–143)
WBC # BLD AUTO: 4.9 K/UL (ref 4.3–11.1)

## 2022-10-26 PROCEDURE — 2500000003 HC RX 250 WO HCPCS: Performed by: NURSE ANESTHETIST, CERTIFIED REGISTERED

## 2022-10-26 PROCEDURE — 6370000000 HC RX 637 (ALT 250 FOR IP): Performed by: ANESTHESIOLOGY

## 2022-10-26 PROCEDURE — 7100000011 HC PHASE II RECOVERY - ADDTL 15 MIN: Performed by: UROLOGY

## 2022-10-26 PROCEDURE — 6360000002 HC RX W HCPCS: Performed by: NURSE ANESTHETIST, CERTIFIED REGISTERED

## 2022-10-26 PROCEDURE — 3700000001 HC ADD 15 MINUTES (ANESTHESIA): Performed by: UROLOGY

## 2022-10-26 PROCEDURE — 55700 PR BIOPSY OF PROSTATE,NEEDLE/PUNCH: CPT | Performed by: UROLOGY

## 2022-10-26 PROCEDURE — 80048 BASIC METABOLIC PNL TOTAL CA: CPT

## 2022-10-26 PROCEDURE — 3600000002 HC SURGERY LEVEL 2 BASE: Performed by: UROLOGY

## 2022-10-26 PROCEDURE — 7100000000 HC PACU RECOVERY - FIRST 15 MIN: Performed by: UROLOGY

## 2022-10-26 PROCEDURE — 88305 TISSUE EXAM BY PATHOLOGIST: CPT

## 2022-10-26 PROCEDURE — 6360000002 HC RX W HCPCS: Performed by: UROLOGY

## 2022-10-26 PROCEDURE — 2580000003 HC RX 258: Performed by: ANESTHESIOLOGY

## 2022-10-26 PROCEDURE — 85027 COMPLETE CBC AUTOMATED: CPT

## 2022-10-26 PROCEDURE — 2580000003 HC RX 258: Performed by: UROLOGY

## 2022-10-26 PROCEDURE — 3600000012 HC SURGERY LEVEL 2 ADDTL 15MIN: Performed by: UROLOGY

## 2022-10-26 PROCEDURE — 3700000000 HC ANESTHESIA ATTENDED CARE: Performed by: UROLOGY

## 2022-10-26 PROCEDURE — 76942 ECHO GUIDE FOR BIOPSY: CPT | Performed by: UROLOGY

## 2022-10-26 PROCEDURE — 7100000001 HC PACU RECOVERY - ADDTL 15 MIN: Performed by: UROLOGY

## 2022-10-26 PROCEDURE — 7100000010 HC PHASE II RECOVERY - FIRST 15 MIN: Performed by: UROLOGY

## 2022-10-26 PROCEDURE — 2709999900 HC NON-CHARGEABLE SUPPLY: Performed by: UROLOGY

## 2022-10-26 RX ORDER — HYDROMORPHONE HYDROCHLORIDE 2 MG/ML
0.25 INJECTION, SOLUTION INTRAMUSCULAR; INTRAVENOUS; SUBCUTANEOUS EVERY 5 MIN PRN
Status: DISCONTINUED | OUTPATIENT
Start: 2022-10-26 | End: 2022-10-26 | Stop reason: HOSPADM

## 2022-10-26 RX ORDER — ONDANSETRON 2 MG/ML
4 INJECTION INTRAMUSCULAR; INTRAVENOUS
Status: DISCONTINUED | OUTPATIENT
Start: 2022-10-26 | End: 2022-10-26 | Stop reason: HOSPADM

## 2022-10-26 RX ORDER — HYDROMORPHONE HYDROCHLORIDE 2 MG/ML
0.5 INJECTION, SOLUTION INTRAMUSCULAR; INTRAVENOUS; SUBCUTANEOUS EVERY 5 MIN PRN
Status: DISCONTINUED | OUTPATIENT
Start: 2022-10-26 | End: 2022-10-26 | Stop reason: HOSPADM

## 2022-10-26 RX ORDER — DEXTROSE MONOHYDRATE 100 MG/ML
INJECTION, SOLUTION INTRAVENOUS CONTINUOUS PRN
Status: DISCONTINUED | OUTPATIENT
Start: 2022-10-26 | End: 2022-10-26 | Stop reason: HOSPADM

## 2022-10-26 RX ORDER — LIDOCAINE HYDROCHLORIDE 10 MG/ML
1 INJECTION, SOLUTION INFILTRATION; PERINEURAL
Status: DISCONTINUED | OUTPATIENT
Start: 2022-10-26 | End: 2022-10-26 | Stop reason: HOSPADM

## 2022-10-26 RX ORDER — SODIUM CHLORIDE 9 MG/ML
INJECTION, SOLUTION INTRAVENOUS PRN
Status: DISCONTINUED | OUTPATIENT
Start: 2022-10-26 | End: 2022-10-26 | Stop reason: HOSPADM

## 2022-10-26 RX ORDER — HYDROMORPHONE HYDROCHLORIDE 2 MG/ML
0.5 INJECTION, SOLUTION INTRAMUSCULAR; INTRAVENOUS; SUBCUTANEOUS
Status: DISCONTINUED | OUTPATIENT
Start: 2022-10-26 | End: 2022-10-26 | Stop reason: HOSPADM

## 2022-10-26 RX ORDER — EPHEDRINE SULFATE/0.9% NACL/PF 50 MG/5 ML
SYRINGE (ML) INTRAVENOUS PRN
Status: DISCONTINUED | OUTPATIENT
Start: 2022-10-26 | End: 2022-10-26 | Stop reason: SDUPTHER

## 2022-10-26 RX ORDER — SODIUM CHLORIDE 0.9 % (FLUSH) 0.9 %
5-40 SYRINGE (ML) INJECTION PRN
Status: DISCONTINUED | OUTPATIENT
Start: 2022-10-26 | End: 2022-10-26 | Stop reason: HOSPADM

## 2022-10-26 RX ORDER — SODIUM CHLORIDE 0.9 % (FLUSH) 0.9 %
5-40 SYRINGE (ML) INJECTION EVERY 12 HOURS SCHEDULED
Status: DISCONTINUED | OUTPATIENT
Start: 2022-10-26 | End: 2022-10-26 | Stop reason: HOSPADM

## 2022-10-26 RX ORDER — LABETALOL HYDROCHLORIDE 5 MG/ML
10 INJECTION, SOLUTION INTRAVENOUS
Status: DISCONTINUED | OUTPATIENT
Start: 2022-10-26 | End: 2022-10-26 | Stop reason: HOSPADM

## 2022-10-26 RX ORDER — PROPOFOL 10 MG/ML
INJECTION, EMULSION INTRAVENOUS PRN
Status: DISCONTINUED | OUTPATIENT
Start: 2022-10-26 | End: 2022-10-26 | Stop reason: SDUPTHER

## 2022-10-26 RX ORDER — ACETAMINOPHEN 500 MG
1000 TABLET ORAL ONCE
Status: COMPLETED | OUTPATIENT
Start: 2022-10-26 | End: 2022-10-26

## 2022-10-26 RX ORDER — OXYCODONE HYDROCHLORIDE 5 MG/1
5 TABLET ORAL PRN
Status: DISCONTINUED | OUTPATIENT
Start: 2022-10-26 | End: 2022-10-26 | Stop reason: HOSPADM

## 2022-10-26 RX ORDER — SODIUM CHLORIDE, SODIUM LACTATE, POTASSIUM CHLORIDE, CALCIUM CHLORIDE 600; 310; 30; 20 MG/100ML; MG/100ML; MG/100ML; MG/100ML
INJECTION, SOLUTION INTRAVENOUS CONTINUOUS
Status: DISCONTINUED | OUTPATIENT
Start: 2022-10-26 | End: 2022-10-26 | Stop reason: HOSPADM

## 2022-10-26 RX ORDER — OXYCODONE HYDROCHLORIDE 5 MG/1
10 TABLET ORAL PRN
Status: DISCONTINUED | OUTPATIENT
Start: 2022-10-26 | End: 2022-10-26 | Stop reason: HOSPADM

## 2022-10-26 RX ORDER — MIDAZOLAM HYDROCHLORIDE 2 MG/2ML
2 INJECTION, SOLUTION INTRAMUSCULAR; INTRAVENOUS
Status: DISCONTINUED | OUTPATIENT
Start: 2022-10-26 | End: 2022-10-26 | Stop reason: HOSPADM

## 2022-10-26 RX ORDER — HYDROMORPHONE HYDROCHLORIDE 2 MG/ML
0.25 INJECTION, SOLUTION INTRAMUSCULAR; INTRAVENOUS; SUBCUTANEOUS
Status: DISCONTINUED | OUTPATIENT
Start: 2022-10-26 | End: 2022-10-26 | Stop reason: HOSPADM

## 2022-10-26 RX ADMIN — PROPOFOL 250 MCG/KG/MIN: 10 INJECTION, EMULSION INTRAVENOUS at 10:20

## 2022-10-26 RX ADMIN — PROPOFOL 70 MG: 10 INJECTION, EMULSION INTRAVENOUS at 10:18

## 2022-10-26 RX ADMIN — PROPOFOL 50 MG: 10 INJECTION, EMULSION INTRAVENOUS at 10:22

## 2022-10-26 RX ADMIN — ACETAMINOPHEN 1000 MG: 500 TABLET, FILM COATED ORAL at 09:35

## 2022-10-26 RX ADMIN — SODIUM CHLORIDE, POTASSIUM CHLORIDE, SODIUM LACTATE AND CALCIUM CHLORIDE: 600; 310; 30; 20 INJECTION, SOLUTION INTRAVENOUS at 09:17

## 2022-10-26 RX ADMIN — Medication 10 MG: at 10:37

## 2022-10-26 RX ADMIN — CEFTRIAXONE 1000 MG: 1 INJECTION, POWDER, FOR SOLUTION INTRAMUSCULAR; INTRAVENOUS at 10:29

## 2022-10-26 RX ADMIN — Medication 10 MG: at 10:47

## 2022-10-26 ASSESSMENT — PAIN - FUNCTIONAL ASSESSMENT
PAIN_FUNCTIONAL_ASSESSMENT: NONE - DENIES PAIN
PAIN_FUNCTIONAL_ASSESSMENT: 0-10

## 2022-10-26 NOTE — ANESTHESIA PRE PROCEDURE
Department of Anesthesiology  Preprocedure Note       Name:  Tonia Day   Age:  50 y.o.  :  1970                                          MRN:  [id number]         Date:  10/26/2022      Surgeon: Norma Pinto):  John Corea MD    Procedure: Procedure(s):  PROSTATE BIOPSY MRI FUSION    Medications prior to admission:   Prior to Admission medications    Medication Sig Start Date End Date Taking? Authorizing Provider   tadalafil (CIALIS) 20 MG tablet Take 1 tablet by mouth daily  Patient taking differently: Take 20 mg by mouth as needed Take 1 tablet by mouth daily 10/19/22   John Corea MD   dexmethylphenidate (FOCALIN) 10 MG tablet Take 10 mg by mouth 2 times daily.  10/19/20   Ar Automatic Reconciliation   famotidine (PEPCID) 20 MG tablet Take 20 mg by mouth daily    Ar Automatic Reconciliation       Current medications:    Current Facility-Administered Medications   Medication Dose Route Frequency Provider Last Rate Last Admin    lidocaine 1 % injection 1 mL  1 mL IntraDERmal Once PRN Brionna Quinonez MD        acetaminophen (TYLENOL) tablet 1,000 mg  1,000 mg Oral Once Brionna Quinonez MD        HYDROmorphone HCl PF (DILAUDID) injection 0.25 mg  0.25 mg IntraVENous Once PRN Brionna Quinonez MD        Or   Chikis Villalobos HYDROmorphone HCl PF (DILAUDID) injection 0.5 mg  0.5 mg IntraVENous Once PRN Brionna Quinonez MD        lactated ringers infusion   IntraVENous Continuous Brionna Quinonez MD        sodium chloride flush 0.9 % injection 5-40 mL  5-40 mL IntraVENous 2 times per day Brionna Quinonez MD        sodium chloride flush 0.9 % injection 5-40 mL  5-40 mL IntraVENous PRN Brionna Quinonez MD        0.9 % sodium chloride infusion   IntraVENous PRN Brionna Quinonez MD        midazolam PF (VERSED) injection 2 mg  2 mg IntraVENous Once PRN Brionna Quinonez MD        cefTRIAXone (ROCEPHIN) 1,000 mg in sodium chloride 0.9 % 50 mL IVPB mini-bag  1,000 mg IntraVENous On Call to [facility] Dylan Diana MD           Allergies:  No Known Allergies    Problem List:    Patient Active Problem List   Diagnosis Code    Inguinal hernia K40.90    Hyperlipidemia E78.5    Depression F32. A    Immune to measles Z78.9    ADD (attention deficit disorder) F98.8    Vitamin D deficiency E55.9    Screening for measles Z11.59    Murmur R01.1    Elevated PSA R97.20       Past Medical History:        Diagnosis Date    ADD (attention deficit disorder)     COVID-19 vaccine series completed 04/21/2021    Depression 01/29/2013    HX-- NO MEDS X 9 YRS    Elevated PSA     GERD (gastroesophageal reflux disease)     controlled with med    Hyperlipidemia 05/15/2015    diet controlled    Leukopenia 01/29/2013    per pt-- a \" low lab one time\"    Murmur 04/17/2014    \" slight\"-- per pt per dr Yayo Cadena always hear it---- never had an echo    Nausea & vomiting 2009    with hernia surg    Sleep apnea     does not wear cpap at hs    Vitamin D deficiency 2/9/2015       Past Surgical History:        Procedure Laterality Date    HERNIA REPAIR  2009    bilateral    ORTHOPEDIC SURGERY Right 2021    foot surg    VASECTOMY         Social History:    Social History     Tobacco Use    Smoking status: Never    Smokeless tobacco: Never   Substance Use Topics    Alcohol use: Yes     Alcohol/week: 8.0 standard drinks     Comment: occ                                Counseling given: Not Answered      Vital Signs (Current):   Vitals:    10/21/22 1431   Weight: 175 lb (79.4 kg)   Height: 5' 9\" (1.753 m)                                              BP Readings from Last 3 Encounters:   No data found for BP       NPO Status:                                                                                 BMI:   Wt Readings from Last 3 Encounters:   10/21/22 175 lb (79.4 kg)   10/03/22 175 lb (79.4 kg)   07/19/21 177 lb (80.3 kg)     Body mass index is 25.84 kg/m².     CBC:   Lab Results   Component Value Date/Time    WBC 4.9 08/18/2020 08:10 AM    RBC 5.43 08/18/2020 08:10 AM    HGB 14.8 08/18/2020 08:10 AM    HCT 44.9 08/18/2020 08:10 AM    MCV 83 08/18/2020 08:10 AM    RDW 14.7 08/18/2020 08:10 AM     08/18/2020 08:10 AM       CMP:   Lab Results   Component Value Date/Time     08/18/2020 08:10 AM    K 4.3 08/18/2020 08:10 AM     08/18/2020 08:10 AM    CO2 23 08/18/2020 08:10 AM    BUN 12 08/18/2020 08:10 AM    CREATININE 1.13 08/18/2020 08:10 AM    GFRAA 89 08/18/2020 08:10 AM    GLUCOSE 94 08/18/2020 08:10 AM    PROT 6.3 08/18/2020 08:10 AM    CALCIUM 9.4 08/18/2020 08:10 AM    BILITOT 0.4 08/18/2020 08:10 AM    ALKPHOS 69 08/18/2020 08:10 AM    AST 17 08/18/2020 08:10 AM    ALT 15 08/18/2020 08:10 AM       POC Tests: No results for input(s): POCGLU, POCNA, POCK, POCCL, POCBUN, POCHEMO, POCHCT in the last 72 hours. Coags: No results found for: PROTIME, INR, APTT    HCG (If Applicable): No results found for: PREGTESTUR, PREGSERUM, HCG, HCGQUANT     ABGs: No results found for: PHART, PO2ART, HVF2SHO, IOQ5TOU, BEART, I4DLNUFN     Type & Screen (If Applicable):  No results found for: LABABO, LABRH    Drug/Infectious Status (If Applicable):  No results found for: HIV, HEPCAB    COVID-19 Screening (If Applicable): No results found for: COVID19        Anesthesia Evaluation  Patient summary reviewed and Nursing notes reviewed   history of anesthetic complications: PONV. Airway: Mallampati: I  TM distance: >3 FB   Neck ROM: full  Mouth opening: > = 3 FB   Dental: normal exam         Pulmonary:Negative Pulmonary ROS breath sounds clear to auscultation  (+) sleep apnea:                             Cardiovascular:  Exercise tolerance: good (>4 METS),   (+) hyperlipidemia        Rhythm: regular  Rate: normal                    Neuro/Psych:   Negative Neuro/Psych ROS               ROS comment: ADD (attention deficit disorder) GI/Hepatic/Renal:   (+) GERD:,          ROS comment: Elevated PSA.    Endo/Other: Abdominal:             Vascular: Other Findings:           Anesthesia Plan      TIVA     ASA 2       Induction: intravenous. Anesthetic plan and risks discussed with patient and spouse. Plan discussed with CRNA.                     Nirav Aquino MD   10/26/2022

## 2022-10-26 NOTE — INTERVAL H&P NOTE
Update History & Physical    The patient's History and Physical of October 19, 2022 was reviewed with the patient and I examined the patient. There was no change. The surgical site was confirmed by the patient and me. Plan: The risks, benefits, expected outcome, and alternative to the recommended procedure have been discussed with the patient. Patient understands and wants to proceed with the procedure.      Electronically signed by Cristopher Tyler MD on 10/26/2022 at 8:40 AM

## 2022-10-26 NOTE — ANESTHESIA POSTPROCEDURE EVALUATION
Department of Anesthesiology  Postprocedure Note    Patient: Larisa Epps  MRN: [id number]  YOB: 1970  Date of evaluation: 10/26/2022      Procedure Summary     Date: 10/26/22 Room / Location: [facility] MAIN OR  / [facility] MAIN OR    Anesthesia Start: 8313 Anesthesia Stop: 0717    Procedure: PROSTATE BIOPSY MRI FUSION (Buttocks) Diagnosis:       Elevated PSA      (Elevated PSA [R97.20])    Providers: Magalys Miller MD Responsible Provider: Yosvany Braxton MD    Anesthesia Type: TIVA ASA Status: 2          Anesthesia Type: No value filed.     [place] Phase I: [place] Score: 8    [place] Phase II:        Anesthesia Post Evaluation    Patient location during evaluation: PACU  Patient participation: complete - patient participated  Level of consciousness: awake and alert  Pain score: 0  Airway patency: patent  Nausea & Vomiting: no nausea and no vomiting  Complications: no  Cardiovascular status: hemodynamically stable  Respiratory status: acceptable, nonlabored ventilation and spontaneous ventilation  Hydration status: euvolemic  Comments: /62   Pulse 90   Temp 97.8 °F (36.6 °C) (Temporal)   Resp 14   Ht 5' 9\" (1.753 m)   Wt 179 lb (81.2 kg)   SpO2 98%   BMI 26.43 kg/m²     Multimodal analgesia pain management approach

## 2022-10-26 NOTE — OP NOTE
Operative Note                Patient: Chapis Ghotra, [id number]    Date of Surgery: 10/26/22    Preoperative Diagnosis: Elevated psa and prostate lesion(s) on MRI imaging    Postoperative Diagnosis:  same    Surgeon(s) and Role:     * Henri Samano MD - Primary     Anesthesia:  mac     Procedure: Procedure(s):  Mirtha Macdonald MRI fused TRUS prostate biopsy     RISKS DISCUSSION:     Discussed the risk of surgery including infection, hematoma, bleeding, and the risks of general anesthetic. The patient understands the risks, any and all questions were answered to the patient's satisfaction and they freely signed the consent for operation. URONAV MRI FUSED TRANSRECTAL ULTRASOUND GUIDED BIOPSY OF THE PROSTATE    All risks, benefits and alternatives were again reviewed and he is willing to proceed at this time. The patient was placed in the left lateral decubitus position. I then inserted the transrectal ultrasound probe into the rectum. The prostate was visualized. The prostate appeared homogenous in appearance. Ultrasonographic sweep of the prostate was performed to obtain images to link to MRI via URONAV. There were 2 regions of interest based upon MRI imaging. 2 biopsies of each region (ROI1=right mid, ROI2=left apex) of interest were then obtained using the ultrasound images for guidance that had been linked to the previous MRI using Uronav. I then performed 6 needle core biopsies using a standard sextant biopsy format with traditional ultrasound images for guidance. The ultrasound probe was removed. The patient tolerated the procedure well.       PROSTATE VOLUME:  36 gr    Estimated Blood Loss:  minimal    Specimens: prostate biopsies             Drains: none                 Implants: * No implants in log *           Signed By: Cortney Arteaga MD

## 2022-11-14 ENCOUNTER — OFFICE VISIT (OUTPATIENT)
Dept: UROLOGY | Age: 52
End: 2022-11-14
Payer: COMMERCIAL

## 2022-11-14 DIAGNOSIS — R97.20 ELEVATED PSA: Primary | ICD-10-CM

## 2022-11-14 PROCEDURE — 99213 OFFICE O/P EST LOW 20 MIN: CPT | Performed by: UROLOGY

## 2022-11-14 NOTE — PROGRESS NOTES
[facility]  [address]    [address]  [facility], [address]  : 1970    Chief Complaint   Patient presents with    Other     Discuss biopsy results          HPI     Park Mosley is a 50 y.o. male referred in regards to elevated psa. His father has an elevated psa but never been diagnosed with prostate cancer. Patient has no LUTS. He uses tadalafil in regards to ed. MRI prostate 10/3/22 revealed a PIRADS4 and PIRADS5 lesion. Uronav biopsy 10/26/22 returned with all cores benign. Gland was 36 gr. PSA:  3.5,  3.0,  4.2          Past Medical History:   Diagnosis Date    ADD (attention deficit disorder)     COVID-19 vaccine series completed 2021    Depression 2013    HX-- NO MEDS X 9 YRS    Elevated PSA     GERD (gastroesophageal reflux disease)     controlled with med    Hyperlipidemia 05/15/2015    diet controlled    Leukopenia 2013    per pt-- a \" low lab one time\"    Murmur 2014    \" slight\"-- per pt per dr Valentina Rubio always hear it---- never had an echo    Nausea & vomiting 2009    with hernia surg    Sleep apnea     does not wear cpap at hs    Vitamin D deficiency 2015     Past Surgical History:   Procedure Laterality Date    HERNIA REPAIR  2009    bilateral    ORTHOPEDIC SURGERY Right     foot surg    PROSTATE BIOPSY N/A 10/26/2022    PROSTATE BIOPSY MRI FUSION performed by Lacy Cardenas MD at [facility] MAIN OR    VASECTOMY       Current Outpatient Medications   Medication Sig Dispense Refill    tadalafil (CIALIS) 20 MG tablet Take 1 tablet by mouth daily (Patient taking differently: Take 20 mg by mouth as needed Take 1 tablet by mouth daily) 30 tablet 5    dexmethylphenidate (FOCALIN) 10 MG tablet Take 10 mg by mouth 2 times daily. famotidine (PEPCID) 20 MG tablet Take 20 mg by mouth daily       No current facility-administered medications for this visit.      No Known Allergies  Social History     Socioeconomic History    Marital status:      Spouse name: Not on file    Number of children: Not on file    Years of education: Not on file    Highest education level: Not on file   Occupational History    Not on file   Tobacco Use    Smoking status: Never    Smokeless tobacco: Never   Vaping Use    Vaping Use: Never used   Substance and Sexual Activity    Alcohol use:  Yes     Alcohol/week: 8.0 standard drinks     Comment: occ    Drug use: No    Sexual activity: Not on file   Other Topics Concern    Not on file   Social History Narrative    Not on file     Social Determinants of Health     Financial Resource Strain: Not on file   Food Insecurity: Not on file   Transportation Needs: Not on file   Physical Activity: Not on file   Stress: Not on file   Social Connections: Not on file   Intimate Partner Violence: Not on file   Housing Stability: Not on file     Family History   Problem Relation Age of Onset    Cancer Mother     Hypertension Father     Elevated Lipids Father     Diabetes Father     Cancer Maternal Grandfather        ROS    Urinalysis  UA - Dipstick  Results for orders placed or performed in visit on 10/19/22   AMB POC URINALYSIS DIP STICK AUTO W/O MICRO   Result Value Ref Range    Color (UA POC)      Clarity (UA POC)      Glucose, Urine, POC Negative Negative    Bilirubin, Urine, POC Negative Negative    KETONES, Urine, POC Negative Negative    Specific Gravity, Urine, POC 1.010 1.001 - 1.035    Blood (UA POC) Negative Negative    pH, Urine, POC 6.5 4.6 - 8.0    Protein, Urine, POC Negative Negative    Urobilinogen, POC 0.2 mg/dL     Nitrite, Urine, POC Negative Negative    Leukocyte Esterase, Urine, POC Negative Negative   Results for orders placed or performed in visit on 09/19/22   AMB POC URINALYSIS DIP STICK AUTO W/O MICRO   Result Value Ref Range    Color, Urine, POC      Clarity, Urine, POC      Glucose, Urine, POC Negative Negative    Bilirubin, Urine, POC Negative Negative    Ketones, Urine, POC Negative Negative    Specific Gravity, Urine, POC 1.005 1.001 - 1.035    Blood, Urine, POC NEGATIVE Negative    pH, Urine, POC 6.0 4.6 - 8.0    Protein, Urine, POC Negative Negative    Urobilinogen, POC 0.2     Nitrate, Urine, POC NEGATIVE Negative    Leukocyte Esterase, Urine, POC Negative Negative       There were no vitals taken for this visit. GENERAL: NAD, ALERT, A&O x 3, GAIT NORMAL  LUNGS: easy work of breathing  ABDOMEN: soft, non tender  NEUROLOGIC: cranial nerves 2-12 grossly intact           Assessment and Plan    ICD-10-CM    1. Elevated PSA  R97.20 PSA, Diagnostic          Pathology report was reviewed. This will reset his \"normal\" in the mid 4's. Pt. will follow up in 6 months with psa for BRADLEY.

## 2023-05-08 ENCOUNTER — NURSE ONLY (OUTPATIENT)
Dept: UROLOGY | Age: 53
End: 2023-05-08

## 2023-05-08 DIAGNOSIS — R97.20 ELEVATED PSA: ICD-10-CM

## 2023-05-09 LAB — PSA SERPL-MCNC: 3.5 NG/ML

## 2023-05-15 ENCOUNTER — OFFICE VISIT (OUTPATIENT)
Dept: UROLOGY | Age: 53
End: 2023-05-15
Payer: COMMERCIAL

## 2023-05-15 DIAGNOSIS — R97.20 ELEVATED PSA: Primary | ICD-10-CM

## 2023-05-15 LAB
BILIRUBIN, URINE, POC: NEGATIVE
BLOOD URINE, POC: NEGATIVE
GLUCOSE URINE, POC: NEGATIVE
KETONES, URINE, POC: NEGATIVE
LEUKOCYTE ESTERASE, URINE, POC: NEGATIVE
NITRITE, URINE, POC: NEGATIVE
PH, URINE, POC: 5.5 (ref 4.6–8)
PROTEIN,URINE, POC: NEGATIVE
SPECIFIC GRAVITY, URINE, POC: 1.01 (ref 1–1.03)
URINALYSIS CLARITY, POC: NORMAL
URINALYSIS COLOR, POC: NORMAL
UROBILINOGEN, POC: NORMAL

## 2023-05-15 PROCEDURE — 81003 URINALYSIS AUTO W/O SCOPE: CPT | Performed by: UROLOGY

## 2023-05-15 PROCEDURE — 99213 OFFICE O/P EST LOW 20 MIN: CPT | Performed by: UROLOGY

## 2023-05-15 RX ORDER — PANTOPRAZOLE SODIUM 40 MG/1
40 TABLET, DELAYED RELEASE ORAL DAILY
COMMUNITY
Start: 2023-04-12

## 2023-05-15 ASSESSMENT — ENCOUNTER SYMPTOMS: BACK PAIN: 0

## 2023-05-15 NOTE — PROGRESS NOTES
[facility]  [address] [place] [id number] [address], [address] [facility]  [address]  : 1970    Chief Complaint   Patient presents with    Follow-up          HPI     Wilfred Mcdaniel is a 50 y.o. male referred in regards to elevated psa. His father has an elevated psa but never been diagnosed with prostate cancer. Patient has no LUTS. He uses tadalafil in regards to ed. MRI prostate 10/3/22 revealed a PIRADS4 and PIRADS5 lesion. Uronav biopsy 10/26/22 returned with all cores benign. Gland was 36 gr. PSA:  3.5,  3.0,  4.2,  3.5             Past Medical History:   Diagnosis Date    ADD (attention deficit disorder)     COVID-19 vaccine series completed 2021    Depression 2013    HX-- NO MEDS X 9 YRS    Elevated PSA     GERD (gastroesophageal reflux disease)     controlled with med    Hyperlipidemia 05/15/2015    diet controlled    Leukopenia 2013    per pt-- a \" low lab one time\"    Murmur 2014    \" slight\"-- per pt per dr Alvaro Shelby always hear it---- never had an echo    Nausea & vomiting     with hernia surg    Sleep apnea     does not wear cpap at hs    Vitamin D deficiency 2015     Past Surgical History:   Procedure Laterality Date    HERNIA REPAIR  2009    bilateral    ORTHOPEDIC SURGERY Right     foot surg    PROSTATE BIOPSY N/A 10/26/2022    PROSTATE BIOPSY MRI FUSION performed by Johann Easton MD at [facility] MAIN OR    VASECTOMY       Current Outpatient Medications   Medication Sig Dispense Refill    pantoprazole (PROTONIX) 40 MG tablet Take 1 tablet by mouth daily      tadalafil (CIALIS) 20 MG tablet Take 1 tablet by mouth daily (Patient taking differently: Take 1 tablet by mouth as needed Take 1 tablet by mouth daily) 30 tablet 5    dexmethylphenidate (FOCALIN) 10 MG tablet Take 1 tablet by mouth 2 times daily. No current facility-administered medications for this visit.      No

## 2023-10-16 ENCOUNTER — APPOINTMENT (RX ONLY)
Dept: URBAN - METROPOLITAN AREA CLINIC 329 | Facility: CLINIC | Age: 53
Setting detail: DERMATOLOGY
End: 2023-10-16

## 2023-10-16 DIAGNOSIS — L57.8 OTHER SKIN CHANGES DUE TO CHRONIC EXPOSURE TO NONIONIZING RADIATION: ICD-10-CM | Status: STABLE

## 2023-10-16 DIAGNOSIS — Z12.83 ENCOUNTER FOR SCREENING FOR MALIGNANT NEOPLASM OF SKIN: ICD-10-CM

## 2023-10-16 DIAGNOSIS — L82.1 OTHER SEBORRHEIC KERATOSIS: ICD-10-CM

## 2023-10-16 DIAGNOSIS — D18.0 HEMANGIOMA: ICD-10-CM

## 2023-10-16 DIAGNOSIS — D22 MELANOCYTIC NEVI: ICD-10-CM

## 2023-10-16 DIAGNOSIS — Z71.89 OTHER SPECIFIED COUNSELING: ICD-10-CM

## 2023-10-16 DIAGNOSIS — L70.0 ACNE VULGARIS: ICD-10-CM | Status: INADEQUATELY CONTROLLED

## 2023-10-16 DIAGNOSIS — L81.4 OTHER MELANIN HYPERPIGMENTATION: ICD-10-CM

## 2023-10-16 PROBLEM — D18.01 HEMANGIOMA OF SKIN AND SUBCUTANEOUS TISSUE: Status: ACTIVE | Noted: 2023-10-16

## 2023-10-16 PROBLEM — D22.61 MELANOCYTIC NEVI OF RIGHT UPPER LIMB, INCLUDING SHOULDER: Status: ACTIVE | Noted: 2023-10-16

## 2023-10-16 PROBLEM — D22.5 MELANOCYTIC NEVI OF TRUNK: Status: ACTIVE | Noted: 2023-10-16

## 2023-10-16 PROBLEM — D22.72 MELANOCYTIC NEVI OF LEFT LOWER LIMB, INCLUDING HIP: Status: ACTIVE | Noted: 2023-10-16

## 2023-10-16 PROBLEM — D22.71 MELANOCYTIC NEVI OF RIGHT LOWER LIMB, INCLUDING HIP: Status: ACTIVE | Noted: 2023-10-16

## 2023-10-16 PROCEDURE — ? SUNSCREEN RECOMMENDATIONS

## 2023-10-16 PROCEDURE — 99204 OFFICE O/P NEW MOD 45 MIN: CPT

## 2023-10-16 PROCEDURE — ? COUNSELING

## 2023-10-16 PROCEDURE — ? PRESCRIPTION MEDICATION MANAGEMENT

## 2023-10-16 PROCEDURE — ? PRESCRIPTION

## 2023-10-16 PROCEDURE — ? FULL BODY SKIN EXAM

## 2023-10-16 PROCEDURE — ? TREATMENT REGIMEN

## 2023-10-16 RX ORDER — CLINDAMYCIN PHOSPHATE 10 MG/ML
SOLUTION TOPICAL BID
Qty: 60 | Refills: 5 | Status: ERX | COMMUNITY
Start: 2023-10-16

## 2023-10-16 RX ADMIN — CLINDAMYCIN PHOSPHATE: 10 SOLUTION TOPICAL at 00:00

## 2023-10-16 ASSESSMENT — LOCATION SIMPLE DESCRIPTION DERM
LOCATION SIMPLE: RIGHT CLAVICULAR SKIN
LOCATION SIMPLE: LEFT UPPER ARM
LOCATION SIMPLE: RIGHT UPPER BACK
LOCATION SIMPLE: ABDOMEN
LOCATION SIMPLE: NOSE
LOCATION SIMPLE: UPPER BACK
LOCATION SIMPLE: ANTERIOR SCALP
LOCATION SIMPLE: RIGHT THIGH
LOCATION SIMPLE: CHEST
LOCATION SIMPLE: LEFT UPPER BACK
LOCATION SIMPLE: LEFT CHEEK
LOCATION SIMPLE: LEFT POSTERIOR THIGH
LOCATION SIMPLE: RIGHT FOREARM
LOCATION SIMPLE: RIGHT UPPER ARM
LOCATION SIMPLE: LEFT CALF

## 2023-10-16 ASSESSMENT — LOCATION DETAILED DESCRIPTION DERM
LOCATION DETAILED: RIGHT VENTRAL DISTAL FOREARM
LOCATION DETAILED: RIGHT CLAVICULAR SKIN
LOCATION DETAILED: RIGHT PROXIMAL DORSAL FOREARM
LOCATION DETAILED: LEFT PROXIMAL POSTERIOR THIGH
LOCATION DETAILED: MID-FRONTAL SCALP
LOCATION DETAILED: LEFT DISTAL POSTERIOR THIGH
LOCATION DETAILED: UPPER STERNUM
LOCATION DETAILED: LEFT ANTERIOR DISTAL UPPER ARM
LOCATION DETAILED: LEFT CENTRAL MALAR CHEEK
LOCATION DETAILED: RIGHT SUPERIOR MEDIAL UPPER BACK
LOCATION DETAILED: RIGHT ANTERIOR DISTAL THIGH
LOCATION DETAILED: EPIGASTRIC SKIN
LOCATION DETAILED: NASAL SUPRATIP
LOCATION DETAILED: LEFT INFERIOR UPPER BACK
LOCATION DETAILED: SUPERIOR THORACIC SPINE
LOCATION DETAILED: LEFT DISTAL CALF
LOCATION DETAILED: RIGHT ANTERIOR DISTAL UPPER ARM

## 2023-10-16 ASSESSMENT — LOCATION ZONE DERM
LOCATION ZONE: NOSE
LOCATION ZONE: LEG
LOCATION ZONE: ARM
LOCATION ZONE: SCALP
LOCATION ZONE: FACE
LOCATION ZONE: TRUNK

## 2023-10-16 NOTE — HPI: PIMPLES (ACNE)
What Type Of Note Output Would You Prefer (Optional)?: Bullet Format
How Severe Is Your Acne?: mild
Is This A New Presentation, Or A Follow-Up?: Acne
Additional Comments (Use Complete Sentences): Patient reports that for the last year he has had a breakout of acne on his nose that he has been using salicylic acid on without relief.

## 2023-10-16 NOTE — PROCEDURE: PRESCRIPTION MEDICATION MANAGEMENT
Initiate Treatment: clindamycin phosphate 1 % topical solution Bid\\nApply to affected areas twice daily to spot treat
Detail Level: Zone
Render In Strict Bullet Format?: No

## 2023-10-16 NOTE — PROCEDURE: COUNSELING
Detail Level: Generalized
Detail Level: Zone
Detail Level: Detailed
Spironolactone Counseling: Patient advised regarding risks of diarrhea, abdominal pain, hyperkalemia, birth defects (for female patients), liver toxicity and renal toxicity. The patient may need blood work to monitor liver and kidney function and potassium levels while on therapy. The patient verbalized understanding of the proper use and possible adverse effects of spironolactone.  All of the patient's questions and concerns were addressed.
Isotretinoin Pregnancy And Lactation Text: This medication is Pregnancy Category X and is considered extremely dangerous during pregnancy. It is unknown if it is excreted in breast milk.
Dapsone Counseling: I discussed with the patient the risks of dapsone including but not limited to hemolytic anemia, agranulocytosis, rashes, methemoglobinemia, kidney failure, peripheral neuropathy, headaches, GI upset, and liver toxicity.  Patients who start dapsone require monitoring including baseline LFTs and weekly CBCs for the first month, then every month thereafter.  The patient verbalized understanding of the proper use and possible adverse effects of dapsone.  All of the patient's questions and concerns were addressed.
Tetracycline Counseling: Patient counseled regarding possible photosensitivity and increased risk for sunburn.  Patient instructed to avoid sunlight, if possible.  When exposed to sunlight, patients should wear protective clothing, sunglasses, and sunscreen.  The patient was instructed to call the office immediately if the following severe adverse effects occur:  hearing changes, easy bruising/bleeding, severe headache, or vision changes.  The patient verbalized understanding of the proper use and possible adverse effects of tetracycline.  All of the patient's questions and concerns were addressed. Patient understands to avoid pregnancy while on therapy due to potential birth defects.
Benzoyl Peroxide Pregnancy And Lactation Text: This medication is Pregnancy Category C. It is unknown if benzoyl peroxide is excreted in breast milk.
Topical Sulfur Applications Counseling: Topical Sulfur Counseling: Patient counseled that this medication may cause skin irritation or allergic reactions.  In the event of skin irritation, the patient was advised to reduce the amount of the drug applied or use it less frequently.   The patient verbalized understanding of the proper use and possible adverse effects of topical sulfur application.  All of the patient's questions and concerns were addressed.
Cleanser Recommendations: Gentle cleanser like Dove for sensitive skin, Cetaphil gentle cleanser or Cerave hydrating cleanser
Azelaic Acid Pregnancy And Lactation Text: This medication is considered safe during pregnancy and breast feeding.
Topical Clindamycin Counseling: Patient counseled that this medication may cause skin irritation or allergic reactions.  In the event of skin irritation, the patient was advised to reduce the amount of the drug applied or use it less frequently.   The patient verbalized understanding of the proper use and possible adverse effects of clindamycin.  All of the patient's questions and concerns were addressed.
Birth Control Pills Counseling: Birth Control Pill Counseling: I discussed with the patient the potential side effects of OCPs including but not limited to increased risk of stroke, heart attack, thrombophlebitis, deep venous thrombosis, hepatic adenomas, breast changes, GI upset, headaches, and depression.  The patient verbalized understanding of the proper use and possible adverse effects of OCPs. All of the patient's questions and concerns were addressed.
Erythromycin Pregnancy And Lactation Text: This medication is Pregnancy Category B and is considered safe during pregnancy. It is also excreted in breast milk.
Use Enhanced Medication Counseling?: No
Tazorac Counseling:  Patient advised that medication is irritating and drying.  Patient may need to apply sparingly and wash off after an hour before eventually leaving it on overnight.  The patient verbalized understanding of the proper use and possible adverse effects of tazorac.  All of the patient's questions and concerns were addressed.
Doxycycline Pregnancy And Lactation Text: This medication is Pregnancy Category D and not consider safe during pregnancy. It is also excreted in breast milk but is considered safe for shorter treatment courses.
Winlevi Pregnancy And Lactation Text: This medication is considered safe during pregnancy and breastfeeding.
Minocycline Counseling: Patient advised regarding possible photosensitivity and discoloration of the teeth, skin, lips, tongue and gums.  Patient instructed to avoid sunlight, if possible.  When exposed to sunlight, patients should wear protective clothing, sunglasses, and sunscreen.  The patient was instructed to call the office immediately if the following severe adverse effects occur:  hearing changes, easy bruising/bleeding, severe headache, or vision changes.  The patient verbalized understanding of the proper use and possible adverse effects of minocycline.  All of the patient's questions and concerns were addressed.
Bactrim Counseling:  I discussed with the patient the risks of sulfa antibiotics including but not limited to GI upset, allergic reaction, drug rash, diarrhea, dizziness, photosensitivity, and yeast infections.  Rarely, more serious reactions can occur including but not limited to aplastic anemia, agranulocytosis, methemoglobinemia, blood dyscrasias, liver or kidney failure, lung infiltrates or desquamative/blistering drug rashes.
Aklief Pregnancy And Lactation Text: It is unknown if this medication is safe to use during pregnancy.  It is unknown if this medication is excreted in breast milk.  Breastfeeding women should use the topical cream on the smallest area of the skin for the shortest time needed while breastfeeding.  Do not apply to nipple and areola.
Sarecycline Counseling: Patient advised regarding possible photosensitivity and discoloration of the teeth, skin, lips, tongue and gums.  Patient instructed to avoid sunlight, if possible.  When exposed to sunlight, patients should wear protective clothing, sunglasses, and sunscreen.  The patient was instructed to call the office immediately if the following severe adverse effects occur:  hearing changes, easy bruising/bleeding, severe headache, or vision changes.  The patient verbalized understanding of the proper use and possible adverse effects of sarecycline.  All of the patient's questions and concerns were addressed.
Tetracycline Pregnancy And Lactation Text: This medication is Pregnancy Category D and not consider safe during pregnancy. It is also excreted in breast milk.
Topical Retinoid counseling:  Patient advised to apply a pea-sized amount only at bedtime and wait 30 minutes after washing their face before applying.  If too drying, patient may add a non-comedogenic moisturizer. The patient verbalized understanding of the proper use and possible adverse effects of retinoids.  All of the patient's questions and concerns were addressed.
Moisturizer Recommendations: Cerave lotion, Cetaphil lotion
Azithromycin Counseling:  I discussed with the patient the risks of azithromycin including but not limited to GI upset, allergic reaction, drug rash, diarrhea, and yeast infections.
Dapsone Pregnancy And Lactation Text: This medication is Pregnancy Category C and is not considered safe during pregnancy or breast feeding.
Benzoyl Peroxide Counseling: Patient counseled that medicine may cause skin irritation and bleach clothing.  In the event of skin irritation, the patient was advised to reduce the amount of the drug applied or use it less frequently.   The patient verbalized understanding of the proper use and possible adverse effects of benzoyl peroxide.  All of the patient's questions and concerns were addressed.
Birth Control Pills Pregnancy And Lactation Text: This medication should be avoided if pregnant and for the first 30 days post-partum.
Isotretinoin Counseling: Patient should get monthly blood tests, not donate blood, not drive at night if vision affected, not share medication, and not undergo elective surgery for 6 months after tx completed. Side effects reviewed, pt to contact office should one occur.
Topical Clindamycin Pregnancy And Lactation Text: This medication is Pregnancy Category B and is considered safe during pregnancy. It is unknown if it is excreted in breast milk.
Spironolactone Pregnancy And Lactation Text: This medication can cause feminization of the male fetus and should be avoided during pregnancy. The active metabolite is also found in breast milk.
High Dose Vitamin A Counseling: Side effects reviewed, pt to contact office should one occur.
Topical Sulfur Applications Pregnancy And Lactation Text: This medication is Pregnancy Category C and has an unknown safety profile during pregnancy. It is unknown if this topical medication is excreted in breast milk.
Sunscreen Recommendation Label Override: Cerave mineral sunscreen
Azelaic Acid Counseling: Patient counseled that medicine may cause skin irritation and to avoid applying near the eyes.  In the event of skin irritation, the patient was advised to reduce the amount of the drug applied or use it less frequently.   The patient verbalized understanding of the proper use and possible adverse effects of azelaic acid.  All of the patient's questions and concerns were addressed.
Bactrim Pregnancy And Lactation Text: This medication is Pregnancy Category D and is known to cause fetal risk.  It is also excreted in breast milk.
Azithromycin Pregnancy And Lactation Text: This medication is considered safe during pregnancy and is also secreted in breast milk.
Aklief counseling:  Patient advised to apply a pea-sized amount only at bedtime and wait 30 minutes after washing their face before applying.  If too drying, patient may add a non-comedogenic moisturizer.  The most commonly reported side effects including irritation, redness, scaling, dryness, stinging, burning, itching, and increased risk of sunburn.  The patient verbalized understanding of the proper use and possible adverse effects of retinoids.  All of the patient's questions and concerns were addressed.
Topical Retinoid Pregnancy And Lactation Text: This medication is Pregnancy Category C. It is unknown if this medication is excreted in breast milk.
Tazorac Pregnancy And Lactation Text: This medication is not safe during pregnancy. It is unknown if this medication is excreted in breast milk.
Erythromycin Counseling:  I discussed with the patient the risks of erythromycin including but not limited to GI upset, allergic reaction, drug rash, diarrhea, increase in liver enzymes, and yeast infections.
Doxycycline Counseling:  Patient counseled regarding possible photosensitivity and increased risk for sunburn.  Patient instructed to avoid sunlight, if possible.  When exposed to sunlight, patients should wear protective clothing, sunglasses, and sunscreen.  The patient was instructed to call the office immediately if the following severe adverse effects occur:  hearing changes, easy bruising/bleeding, severe headache, or vision changes.  The patient verbalized understanding of the proper use and possible adverse effects of doxycycline.  All of the patient's questions and concerns were addressed.
Winlevi Counseling:  I discussed with the patient the risks of topical clascoterone including but not limited to erythema, scaling, itching, and stinging. Patient voiced their understanding.
High Dose Vitamin A Pregnancy And Lactation Text: High dose vitamin A therapy is contraindicated during pregnancy and breast feeding.

## 2023-10-16 NOTE — HPI: SKIN LESION
What Type Of Note Output Would You Prefer (Optional)?: Bullet Format
How Severe Is Your Skin Lesion?: mild
Has Your Skin Lesion Been Treated?: not been treated
Is This A New Presentation, Or A Follow-Up?: Skin Lesions
Which Family Member (Optional)?: Maternal grandfather
Additional History: Patient reports that he would like to have general skin lesions evaluated.

## 2023-11-08 ENCOUNTER — NURSE ONLY (OUTPATIENT)
Dept: UROLOGY | Age: 53
End: 2023-11-08

## 2023-11-08 DIAGNOSIS — R97.20 ELEVATED PSA: ICD-10-CM

## 2023-11-08 LAB — PSA SERPL-MCNC: 3.5 NG/ML

## 2023-11-15 ENCOUNTER — OFFICE VISIT (OUTPATIENT)
Dept: UROLOGY | Age: 53
End: 2023-11-15
Payer: COMMERCIAL

## 2023-11-15 DIAGNOSIS — R97.20 ELEVATED PSA: Primary | ICD-10-CM

## 2023-11-15 LAB
BILIRUBIN, URINE, POC: NEGATIVE
BLOOD URINE, POC: NEGATIVE
GLUCOSE URINE, POC: NEGATIVE
KETONES, URINE, POC: NEGATIVE
LEUKOCYTE ESTERASE, URINE, POC: NEGATIVE
NITRITE, URINE, POC: NEGATIVE
PH, URINE, POC: 6 (ref 4.6–8)
PROTEIN,URINE, POC: NEGATIVE
SPECIFIC GRAVITY, URINE, POC: 1.01 (ref 1–1.03)
URINALYSIS CLARITY, POC: ABNORMAL
URINALYSIS COLOR, POC: ABNORMAL
UROBILINOGEN, POC: ABNORMAL

## 2023-11-15 PROCEDURE — 81003 URINALYSIS AUTO W/O SCOPE: CPT | Performed by: UROLOGY

## 2023-11-15 PROCEDURE — 99213 OFFICE O/P EST LOW 20 MIN: CPT | Performed by: UROLOGY

## 2023-11-15 ASSESSMENT — ENCOUNTER SYMPTOMS: BACK PAIN: 0

## 2023-11-15 NOTE — PROGRESS NOTES
[facility]  [facility]    [address]ren[address], [address]  [facility]  : 1970    Chief Complaint   Patient presents with    Follow-up     6 mo          HPI     Michelle Armando is a 48 y.o. malereferred in regards to elevated psa. His father was diagnosed with prostate cancer in his mid 66's and is on surveillance. Patient has no LUTS. He uses tadalafil in regards to ed. MRI prostate 10/3/22 revealed a PIRADS4 and PIRADS5 lesion. Uronav biopsy 10/26/22 returned with all cores benign. Gland was 36 gr. PSA:  3.5,  3.0,  4.2,  3.5,  3.5             Past Medical History:   Diagnosis Date    ADD (attention deficit disorder)     COVID-19 vaccine series completed 2021    Depression 2013    HX-- NO MEDS X 9 YRS    Elevated PSA     GERD (gastroesophageal reflux disease)     controlled with med    Hyperlipidemia 05/15/2015    diet controlled    Leukopenia 2013    per pt-- a \" low lab one time\"    Murmur 2014    \" slight\"-- per pt per dr Bailee Colón always hear it---- never had an echo    Nausea & vomiting     with hernia surg    Sleep apnea     does not wear cpap at hs    Vitamin D deficiency 2015     Past Surgical History:   Procedure Laterality Date    HERNIA REPAIR  2009    bilateral    ORTHOPEDIC SURGERY Right     foot surg    PROSTATE BIOPSY N/A 10/26/2022    PROSTATE BIOPSY MRI FUSION performed by Chase Mock MD at [facility] MAIN OR    VASECTOMY       Current Outpatient Medications   Medication Sig Dispense Refill    pantoprazole (PROTONIX) 40 MG tablet Take 1 tablet by mouth daily      tadalafil (CIALIS) 20 MG tablet Take 1 tablet by mouth daily (Patient taking differently: Take 1 tablet by mouth as needed Take 1 tablet by mouth daily) 30 tablet 5    dexmethylphenidate (FOCALIN) 10 MG tablet Take 1 tablet by mouth 2 times daily.        No current facility-administered

## 2024-05-16 ENCOUNTER — OFFICE VISIT (OUTPATIENT)
Dept: UROLOGY | Age: 54
End: 2024-05-16
Payer: COMMERCIAL

## 2024-05-16 DIAGNOSIS — N52.9 ERECTILE DYSFUNCTION, UNSPECIFIED ERECTILE DYSFUNCTION TYPE: ICD-10-CM

## 2024-05-16 DIAGNOSIS — R97.20 ELEVATED PSA: Primary | ICD-10-CM

## 2024-05-16 LAB
BILIRUBIN, URINE, POC: NEGATIVE
BLOOD URINE, POC: NEGATIVE
GLUCOSE URINE, POC: NEGATIVE
KETONES, URINE, POC: NEGATIVE
LEUKOCYTE ESTERASE, URINE, POC: NEGATIVE
NITRITE, URINE, POC: NEGATIVE
PH, URINE, POC: 5.5 (ref 4.6–8)
PROTEIN,URINE, POC: NEGATIVE
PSA SERPL-MCNC: 2.7 NG/ML (ref 0–4)
SPECIFIC GRAVITY, URINE, POC: 1.02 (ref 1–1.03)
URINALYSIS CLARITY, POC: NORMAL
URINALYSIS COLOR, POC: NORMAL
UROBILINOGEN, POC: NORMAL

## 2024-05-16 PROCEDURE — 81003 URINALYSIS AUTO W/O SCOPE: CPT | Performed by: UROLOGY

## 2024-05-16 PROCEDURE — 99213 OFFICE O/P EST LOW 20 MIN: CPT | Performed by: UROLOGY

## 2024-05-16 ASSESSMENT — ENCOUNTER SYMPTOMS: BACK PAIN: 0

## 2024-05-16 NOTE — PROGRESS NOTES
[facility]  [address] [address]  [phone]          Keshawn Montes  : 1970    Chief Complaint   Patient presents with    Follow-up     6 mo          HPI     Keshawn Montes is a 53 y.o. malereferred in regards to elevated psa.  His father was diagnosed with prostate cancer in his mid 70's and is on surveillance.  Patient has no LUTS.       He uses tadalafil in regards to ed.     MRI prostate 10/3/22 revealed a PIRADS4 and PIRADS5 lesion.  Uronav biopsy 10/26/22 returned with all cores benign.  Gland was 36 gr.       PSA:  3.5,  3.0,  4.2,  3.5,  3.5             Past Medical History:   Diagnosis Date    ADD (attention deficit disorder)     COVID-19 vaccine series completed 2021    Depression 2013    HX-- NO MEDS X 9 YRS    Elevated PSA     GERD (gastroesophageal reflux disease)     controlled with med    Hyperlipidemia 05/15/2015    diet controlled    Leukopenia 2013    per pt-- a \" low lab one time\"    Murmur 2014    \" slight\"-- per pt per dr raza cant always hear it---- never had an echo    Nausea & vomiting     with hernia surg    Sleep apnea     does not wear cpap at     Vitamin D deficiency 2015     Past Surgical History:   Procedure Laterality Date    HERNIA REPAIR  2009    bilateral    ORTHOPEDIC SURGERY Right     foot surg    PROSTATE BIOPSY N/A 10/26/2022    PROSTATE BIOPSY MRI FUSION performed by Kirit Burgess MD at [facility] MAIN OR    VASECTOMY       Current Outpatient Medications   Medication Sig Dispense Refill    pantoprazole (PROTONIX) 40 MG tablet Take 1 tablet by mouth daily      dexmethylphenidate (FOCALIN) 10 MG tablet Take 1 tablet by mouth 2 times daily.      tadalafil (CIALIS) 20 MG tablet Take 1 tablet by mouth daily (Patient not taking: Reported on 2024) 30 tablet 5     No current facility-administered medications for this visit.     No Known

## 2024-11-11 ENCOUNTER — LAB (OUTPATIENT)
Dept: UROLOGY | Age: 54
End: 2024-11-11

## 2024-11-11 DIAGNOSIS — R97.20 ELEVATED PSA: ICD-10-CM

## 2024-11-11 LAB — PSA SERPL-MCNC: 2.6 NG/ML (ref 0–4)

## 2024-11-18 ENCOUNTER — TELEPHONE (OUTPATIENT)
Dept: UROLOGY | Age: 54
End: 2024-11-18

## 2024-11-18 NOTE — TELEPHONE ENCOUNTER
Pt called to r/s appt due to work conflict, states that he would like to push appt back 6 months as he saw his psa results and they are low, at pts request appt r/s in April.

## 2025-04-02 ENCOUNTER — OFFICE VISIT (OUTPATIENT)
Dept: UROLOGY | Age: 55
End: 2025-04-02
Payer: COMMERCIAL

## 2025-04-02 DIAGNOSIS — R97.20 ELEVATED PSA: Primary | ICD-10-CM

## 2025-04-02 DIAGNOSIS — N52.9 ERECTILE DYSFUNCTION, UNSPECIFIED ERECTILE DYSFUNCTION TYPE: ICD-10-CM

## 2025-04-02 LAB
BILIRUBIN, URINE, POC: NEGATIVE
BLOOD URINE, POC: NEGATIVE
GLUCOSE URINE, POC: NEGATIVE MG/DL
KETONES, URINE, POC: NEGATIVE MG/DL
LEUKOCYTE ESTERASE, URINE, POC: NEGATIVE
NITRITE, URINE, POC: NEGATIVE
PH, URINE, POC: 6 (ref 4.6–8)
PROTEIN,URINE, POC: NEGATIVE MG/DL
SPECIFIC GRAVITY, URINE, POC: 1.01 (ref 1–1.03)
URINALYSIS CLARITY, POC: NORMAL
URINALYSIS COLOR, POC: NORMAL
UROBILINOGEN, POC: NORMAL MG/DL

## 2025-04-02 PROCEDURE — 81003 URINALYSIS AUTO W/O SCOPE: CPT | Performed by: UROLOGY

## 2025-04-02 PROCEDURE — 99213 OFFICE O/P EST LOW 20 MIN: CPT | Performed by: UROLOGY

## 2025-04-02 ASSESSMENT — ENCOUNTER SYMPTOMS: BACK PAIN: 0

## 2025-04-02 NOTE — PROGRESS NOTES
Leukocyte Esterase, Urine, POC Negative Negative       There were no vitals taken for this visit.     GENERAL: NAD, ALERT, A&O x 3, GAIT NORMAL  LUNGS: easy work of breathing  ABDOMEN: soft, non tender  NEUROLOGIC: cranial nerves 2-12 grossly intact   BRADLEY: 1+ no nodule        Assessment and Plan    ICD-10-CM    1. Elevated PSA  R97.20 AMB POC URINALYSIS DIP STICK AUTO W/O MICRO     PSA, Diagnostic     PSA, Diagnostic      2. Erectile dysfunction, unspecified erectile dysfunction type  N52.9 AMB POC URINALYSIS DIP STICK AUTO W/O MICRO          He has a psa draw scheduled for next week.  We will review that value.  If no change, Pt. will follow up in 6 months with psa +/- BRADLEY.     I have spent 20 minutes today reviewing previous notes, test results and face to face with the patient as well as documenting.

## 2025-04-11 DIAGNOSIS — R97.20 ELEVATED PSA: ICD-10-CM

## 2025-04-11 LAB — PSA SERPL-MCNC: 4.1 NG/ML (ref 0–4)

## (undated) DEVICE — DISPOSABLE TOURNIQUET CUFF SINGLE BLADDER, DUAL PORT AND QUICK CONNECT CONNECTOR: Brand: COLOR CUFF

## (undated) DEVICE — SUTURE MCRYL SZ 3-0 L27IN ABSRB UD L19MM PS-2 3/8 CIR PRIM Y427H

## (undated) DEVICE — JELLY LUBRICATING 10GM PREFIL SYR LUBE

## (undated) DEVICE — TRNQT RMFG 18IN CUFF SING BLAD -- LAWSON OEM ITEM 338151

## (undated) DEVICE — SOLUTION IV 1000ML 0.9% SOD CHL

## (undated) DEVICE — BUTTON SWITCH PENCIL BLADE ELECTRODE, HOLSTER: Brand: EDGE

## (undated) DEVICE — DRSG GZ OIL EMUL CURAD 3X8 --

## (undated) DEVICE — 2000CC GUARDIAN II: Brand: GUARDIAN

## (undated) DEVICE — AMD ANTIMICROBIAL GAUZE SPONGES,12 PLY USP TYPE VII, 0.2% POLYHEXAMETHYLENE BIGUANIDE HCI (PHMB): Brand: CURITY

## (undated) DEVICE — 3M™ COBAN™ NL STERILE NON-LATEX SELF-ADHERENT WRAP, 2082S, 2 IN X 5 YD (5 CM X 4,5 M), 36 ROLLS/CASE: Brand: 3M™ COBAN™

## (undated) DEVICE — FOOT & ANKLE SOFT DR WOMACK: Brand: MEDLINE INDUSTRIES, INC.

## (undated) DEVICE — MAX-CORE® DISPOSABLE CORE BIOPSY INSTRUMENT, 18G X 25CM: Brand: MAX-CORE

## (undated) DEVICE — REM POLYHESIVE ADULT PATIENT RETURN ELECTRODE: Brand: VALLEYLAB

## (undated) DEVICE — PREP SKN CHLRAPRP APL 26ML STR --

## (undated) DEVICE — DRAPE TWL SURG 16X26IN BLU ORB04] ALLCARE INC]

## (undated) DEVICE — BNDG ELAS ESMARK 4INX12FT LF -- STRL

## (undated) DEVICE — STERILE PACKED. BORE DIAMETER 1.6 MM; ANGLE OF INSERTION 19° TO THE LONG AXIS OF THE TRANSDUCER: Brand: SINGLE-USE BIPLANE BIOPSY GUIDE

## (undated) DEVICE — HOLDER PRB URONAV

## (undated) DEVICE — PADDING CAST W2INXL4YD ST COT COHESIVE HND TEARABLE SPEC

## (undated) DEVICE — SUT ETHLN 3-0 18IN PS1 BLK --